# Patient Record
Sex: MALE | Race: WHITE
[De-identification: names, ages, dates, MRNs, and addresses within clinical notes are randomized per-mention and may not be internally consistent; named-entity substitution may affect disease eponyms.]

---

## 2018-06-05 ENCOUNTER — HOSPITAL ENCOUNTER (OUTPATIENT)
Dept: HOSPITAL 8 - CFH | Age: 68
End: 2018-06-05
Attending: INTERNAL MEDICINE
Payer: MEDICARE

## 2018-06-05 DIAGNOSIS — I48.0: Primary | ICD-10-CM

## 2018-06-05 DIAGNOSIS — I10: ICD-10-CM

## 2018-06-05 PROCEDURE — 78452 HT MUSCLE IMAGE SPECT MULT: CPT

## 2018-06-05 PROCEDURE — A9502 TC99M TETROFOSMIN: HCPCS

## 2018-06-05 PROCEDURE — 93017 CV STRESS TEST TRACING ONLY: CPT

## 2018-08-08 ENCOUNTER — APPOINTMENT (OUTPATIENT)
Dept: OTHER | Facility: IMAGING CENTER | Age: 68
End: 2018-08-08

## 2018-08-14 ENCOUNTER — HOSPITAL ENCOUNTER (OUTPATIENT)
Dept: RADIOLOGY | Facility: MEDICAL CENTER | Age: 68
DRG: 460 | End: 2018-08-14
Attending: NEUROLOGICAL SURGERY | Admitting: NEUROLOGICAL SURGERY
Payer: MEDICARE

## 2018-08-14 DIAGNOSIS — Z01.810 PRE-OPERATIVE CARDIOVASCULAR EXAMINATION: ICD-10-CM

## 2018-08-14 DIAGNOSIS — Z01.811 PRE-OPERATIVE RESPIRATORY EXAMINATION: ICD-10-CM

## 2018-08-14 DIAGNOSIS — Z01.812 PRE-OPERATIVE LABORATORY EXAMINATION: ICD-10-CM

## 2018-08-14 LAB
ANION GAP SERPL CALC-SCNC: 8 MMOL/L (ref 0–11.9)
APTT PPP: 25.3 SEC (ref 24.7–36)
BASOPHILS # BLD AUTO: 0.5 % (ref 0–1.8)
BASOPHILS # BLD: 0.04 K/UL (ref 0–0.12)
BUN SERPL-MCNC: 14 MG/DL (ref 8–22)
CALCIUM SERPL-MCNC: 9.3 MG/DL (ref 8.5–10.5)
CHLORIDE SERPL-SCNC: 102 MMOL/L (ref 96–112)
CO2 SERPL-SCNC: 28 MMOL/L (ref 20–33)
CREAT SERPL-MCNC: 0.88 MG/DL (ref 0.5–1.4)
EKG IMPRESSION: NORMAL
EOSINOPHIL # BLD AUTO: 0.15 K/UL (ref 0–0.51)
EOSINOPHIL NFR BLD: 1.7 % (ref 0–6.9)
ERYTHROCYTE [DISTWIDTH] IN BLOOD BY AUTOMATED COUNT: 44.6 FL (ref 35.9–50)
GLUCOSE SERPL-MCNC: 118 MG/DL (ref 65–99)
HCT VFR BLD AUTO: 47.4 % (ref 42–52)
HGB BLD-MCNC: 15.5 G/DL (ref 14–18)
IMM GRANULOCYTES # BLD AUTO: 0.03 K/UL (ref 0–0.11)
IMM GRANULOCYTES NFR BLD AUTO: 0.3 % (ref 0–0.9)
INR PPP: 1.02 (ref 0.87–1.13)
LYMPHOCYTES # BLD AUTO: 2.32 K/UL (ref 1–4.8)
LYMPHOCYTES NFR BLD: 26.9 % (ref 22–41)
MCH RBC QN AUTO: 29.4 PG (ref 27–33)
MCHC RBC AUTO-ENTMCNC: 32.7 G/DL (ref 33.7–35.3)
MCV RBC AUTO: 89.9 FL (ref 81.4–97.8)
MONOCYTES # BLD AUTO: 0.63 K/UL (ref 0–0.85)
MONOCYTES NFR BLD AUTO: 7.3 % (ref 0–13.4)
NEUTROPHILS # BLD AUTO: 5.45 K/UL (ref 1.82–7.42)
NEUTROPHILS NFR BLD: 63.3 % (ref 44–72)
NRBC # BLD AUTO: 0 K/UL
NRBC BLD-RTO: 0 /100 WBC
PLATELET # BLD AUTO: 237 K/UL (ref 164–446)
PMV BLD AUTO: 10.6 FL (ref 9–12.9)
POTASSIUM SERPL-SCNC: 3.6 MMOL/L (ref 3.6–5.5)
PROTHROMBIN TIME: 13.1 SEC (ref 12–14.6)
RBC # BLD AUTO: 5.27 M/UL (ref 4.7–6.1)
SODIUM SERPL-SCNC: 138 MMOL/L (ref 135–145)
WBC # BLD AUTO: 8.6 K/UL (ref 4.8–10.8)

## 2018-08-14 PROCEDURE — 71046 X-RAY EXAM CHEST 2 VIEWS: CPT

## 2018-08-14 PROCEDURE — 80048 BASIC METABOLIC PNL TOTAL CA: CPT

## 2018-08-14 PROCEDURE — 93010 ELECTROCARDIOGRAM REPORT: CPT | Performed by: INTERNAL MEDICINE

## 2018-08-14 PROCEDURE — 85730 THROMBOPLASTIN TIME PARTIAL: CPT

## 2018-08-14 PROCEDURE — 85025 COMPLETE CBC W/AUTO DIFF WBC: CPT

## 2018-08-14 PROCEDURE — 85610 PROTHROMBIN TIME: CPT

## 2018-08-14 PROCEDURE — 93005 ELECTROCARDIOGRAM TRACING: CPT

## 2018-08-14 PROCEDURE — 36415 COLL VENOUS BLD VENIPUNCTURE: CPT

## 2018-08-14 RX ORDER — CLOPIDOGREL BISULFATE 75 MG/1
75 TABLET ORAL DAILY
Status: ON HOLD | COMMUNITY
End: 2018-08-30

## 2018-08-14 RX ORDER — OXYCODONE HYDROCHLORIDE 10 MG/1
10 TABLET ORAL 3 TIMES DAILY
COMMUNITY

## 2018-08-14 RX ORDER — METOPROLOL SUCCINATE 50 MG/1
50 TABLET, EXTENDED RELEASE ORAL EVERY EVENING
COMMUNITY

## 2018-08-14 RX ORDER — IMIPRAMINE HCL 50 MG
100 TABLET ORAL NIGHTLY
COMMUNITY

## 2018-08-14 RX ORDER — GLIPIZIDE 10 MG/1
10 TABLET ORAL EVERY EVENING
COMMUNITY

## 2018-08-28 ENCOUNTER — APPOINTMENT (OUTPATIENT)
Dept: RADIOLOGY | Facility: MEDICAL CENTER | Age: 68
DRG: 460 | End: 2018-08-28
Attending: NEUROLOGICAL SURGERY
Payer: MEDICARE

## 2018-08-28 ENCOUNTER — HOSPITAL ENCOUNTER (INPATIENT)
Facility: MEDICAL CENTER | Age: 68
LOS: 2 days | DRG: 460 | End: 2018-08-30
Attending: NEUROLOGICAL SURGERY | Admitting: NEUROLOGICAL SURGERY
Payer: MEDICARE

## 2018-08-28 LAB
GLUCOSE BLD-MCNC: 125 MG/DL (ref 65–99)
GLUCOSE BLD-MCNC: 142 MG/DL (ref 65–99)
GLUCOSE BLD-MCNC: 149 MG/DL (ref 65–99)

## 2018-08-28 PROCEDURE — 160009 HCHG ANES TIME/MIN: Performed by: NEUROLOGICAL SURGERY

## 2018-08-28 PROCEDURE — 500885 HCHG PACK, JACKSON TABLE: Performed by: NEUROLOGICAL SURGERY

## 2018-08-28 PROCEDURE — 0SG3071 FUSION OF LUMBOSACRAL JOINT WITH AUTOLOGOUS TISSUE SUBSTITUTE, POSTERIOR APPROACH, POSTERIOR COLUMN, OPEN APPROACH: ICD-10-PCS | Performed by: NEUROLOGICAL SURGERY

## 2018-08-28 PROCEDURE — A9270 NON-COVERED ITEM OR SERVICE: HCPCS

## 2018-08-28 PROCEDURE — A4314 CATH W/DRAINAGE 2-WAY LATEX: HCPCS | Performed by: NEUROLOGICAL SURGERY

## 2018-08-28 PROCEDURE — 700111 HCHG RX REV CODE 636 W/ 250 OVERRIDE (IP)

## 2018-08-28 PROCEDURE — 700101 HCHG RX REV CODE 250

## 2018-08-28 PROCEDURE — 500367 HCHG DRAIN KIT, HEMOVAC: Performed by: NEUROLOGICAL SURGERY

## 2018-08-28 PROCEDURE — 01NB0ZZ RELEASE LUMBAR NERVE, OPEN APPROACH: ICD-10-PCS | Performed by: NEUROLOGICAL SURGERY

## 2018-08-28 PROCEDURE — A9270 NON-COVERED ITEM OR SERVICE: HCPCS | Performed by: NURSE PRACTITIONER

## 2018-08-28 PROCEDURE — 700102 HCHG RX REV CODE 250 W/ 637 OVERRIDE(OP)

## 2018-08-28 PROCEDURE — 700102 HCHG RX REV CODE 250 W/ 637 OVERRIDE(OP): Performed by: NURSE PRACTITIONER

## 2018-08-28 PROCEDURE — 110371 HCHG SHELL REV 272: Performed by: NEUROLOGICAL SURGERY

## 2018-08-28 PROCEDURE — 160035 HCHG PACU - 1ST 60 MINS PHASE I: Performed by: NEUROLOGICAL SURGERY

## 2018-08-28 PROCEDURE — 160029 HCHG SURGERY MINUTES - 1ST 30 MINS LEVEL 4: Performed by: NEUROLOGICAL SURGERY

## 2018-08-28 PROCEDURE — C1713 ANCHOR/SCREW BN/BN,TIS/BN: HCPCS | Performed by: NEUROLOGICAL SURGERY

## 2018-08-28 PROCEDURE — 160002 HCHG RECOVERY MINUTES (STAT): Performed by: NEUROLOGICAL SURGERY

## 2018-08-28 PROCEDURE — 700111 HCHG RX REV CODE 636 W/ 250 OVERRIDE (IP): Performed by: NURSE PRACTITIONER

## 2018-08-28 PROCEDURE — 160048 HCHG OR STATISTICAL LEVEL 1-5: Performed by: NEUROLOGICAL SURGERY

## 2018-08-28 PROCEDURE — 501838 HCHG SUTURE GENERAL: Performed by: NEUROLOGICAL SURGERY

## 2018-08-28 PROCEDURE — 82962 GLUCOSE BLOOD TEST: CPT

## 2018-08-28 PROCEDURE — 700112 HCHG RX REV CODE 229: Performed by: NURSE PRACTITIONER

## 2018-08-28 PROCEDURE — 501837 HCHG SUTURE CV: Performed by: NEUROLOGICAL SURGERY

## 2018-08-28 PROCEDURE — 160041 HCHG SURGERY MINUTES - EA ADDL 1 MIN LEVEL 4: Performed by: NEUROLOGICAL SURGERY

## 2018-08-28 PROCEDURE — 0SB40ZZ EXCISION OF LUMBOSACRAL DISC, OPEN APPROACH: ICD-10-PCS | Performed by: NEUROLOGICAL SURGERY

## 2018-08-28 PROCEDURE — 700101 HCHG RX REV CODE 250: Performed by: NURSE PRACTITIONER

## 2018-08-28 PROCEDURE — 01NR0ZZ RELEASE SACRAL NERVE, OPEN APPROACH: ICD-10-PCS | Performed by: NEUROLOGICAL SURGERY

## 2018-08-28 PROCEDURE — 160036 HCHG PACU - EA ADDL 30 MINS PHASE I: Performed by: NEUROLOGICAL SURGERY

## 2018-08-28 PROCEDURE — 110454 HCHG SHELL REV 250: Performed by: NEUROLOGICAL SURGERY

## 2018-08-28 PROCEDURE — 770001 HCHG ROOM/CARE - MED/SURG/GYN PRIV*

## 2018-08-28 PROCEDURE — 00QT0ZZ REPAIR SPINAL MENINGES, OPEN APPROACH: ICD-10-PCS | Performed by: NEUROLOGICAL SURGERY

## 2018-08-28 PROCEDURE — 72100 X-RAY EXAM L-S SPINE 2/3 VWS: CPT

## 2018-08-28 PROCEDURE — 502240 HCHG MISC OR SUPPLY RC 0272: Performed by: NEUROLOGICAL SURGERY

## 2018-08-28 DEVICE — SCREW MAS  SOLERA 6.5 X 50MM (1TCX16+3TCX8=40): Type: IMPLANTABLE DEVICE | Site: SPINE LUMBAR | Status: FUNCTIONAL

## 2018-08-28 DEVICE — SCREW SOLERA SET SCREW (1TCX40+3TCX21+2TCX10=123): Type: IMPLANTABLE DEVICE | Site: SPINE LUMBAR | Status: FUNCTIONAL

## 2018-08-28 DEVICE — ROD PREBENT TITANIUM 5.5 X 35MM (2TCONX2=4): Type: IMPLANTABLE DEVICE | Site: SPINE LUMBAR | Status: FUNCTIONAL

## 2018-08-28 DEVICE — ROD PREBENT TITANIUM 5.5 X 40MM (2TCONX2=4): Type: IMPLANTABLE DEVICE | Site: SPINE LUMBAR | Status: FUNCTIONAL

## 2018-08-28 DEVICE — DURASEAL SEALANT SYSTEM 5ML - (5/BX): Type: IMPLANTABLE DEVICE | Site: SPINE LUMBAR | Status: FUNCTIONAL

## 2018-08-28 DEVICE — CROSSLINK TSRH 5.5 MULTI 1.75 - (3TX2=6) 1.75-2.15: Type: IMPLANTABLE DEVICE | Site: SPINE LUMBAR | Status: FUNCTIONAL

## 2018-08-28 DEVICE — GRAFT DURAMATRIX ONLAY PLUS 1IN X 1IN OR 2.7CM X 2.75CM: Type: IMPLANTABLE DEVICE | Site: SPINE LUMBAR | Status: FUNCTIONAL

## 2018-08-28 RX ORDER — SIMVASTATIN 20 MG
40 TABLET ORAL NIGHTLY
Status: DISCONTINUED | OUTPATIENT
Start: 2018-08-28 | End: 2018-08-30 | Stop reason: HOSPADM

## 2018-08-28 RX ORDER — METOPROLOL SUCCINATE 25 MG/1
50 TABLET, EXTENDED RELEASE ORAL EVERY EVENING
Status: DISCONTINUED | OUTPATIENT
Start: 2018-08-28 | End: 2018-08-30 | Stop reason: HOSPADM

## 2018-08-28 RX ORDER — LIDOCAINE HYDROCHLORIDE 10 MG/ML
0.5 INJECTION, SOLUTION INFILTRATION; PERINEURAL
Status: DISCONTINUED | OUTPATIENT
Start: 2018-08-28 | End: 2018-08-28

## 2018-08-28 RX ORDER — CLONIDINE HYDROCHLORIDE 0.1 MG/1
0.2 TABLET ORAL 2 TIMES DAILY
Status: DISCONTINUED | OUTPATIENT
Start: 2018-08-28 | End: 2018-08-30 | Stop reason: HOSPADM

## 2018-08-28 RX ORDER — SODIUM CHLORIDE 9 MG/ML
INJECTION, SOLUTION INTRAVENOUS CONTINUOUS
Status: DISCONTINUED | OUTPATIENT
Start: 2018-08-28 | End: 2018-08-28

## 2018-08-28 RX ORDER — LIDOCAINE HYDROCHLORIDE 10 MG/ML
INJECTION, SOLUTION INFILTRATION; PERINEURAL
Status: COMPLETED
Start: 2018-08-28 | End: 2018-08-28

## 2018-08-28 RX ORDER — ONDANSETRON 2 MG/ML
4 INJECTION INTRAMUSCULAR; INTRAVENOUS EVERY 4 HOURS PRN
Status: DISCONTINUED | OUTPATIENT
Start: 2018-08-28 | End: 2018-08-30 | Stop reason: HOSPADM

## 2018-08-28 RX ORDER — DIPHENHYDRAMINE HCL 25 MG
25 TABLET ORAL EVERY 6 HOURS PRN
Status: DISCONTINUED | OUTPATIENT
Start: 2018-08-28 | End: 2018-08-30 | Stop reason: HOSPADM

## 2018-08-28 RX ORDER — ENEMA 19; 7 G/133ML; G/133ML
1 ENEMA RECTAL
Status: DISCONTINUED | OUTPATIENT
Start: 2018-08-28 | End: 2018-08-30 | Stop reason: HOSPADM

## 2018-08-28 RX ORDER — SODIUM CHLORIDE AND POTASSIUM CHLORIDE 150; 900 MG/100ML; MG/100ML
INJECTION, SOLUTION INTRAVENOUS CONTINUOUS
Status: DISCONTINUED | OUTPATIENT
Start: 2018-08-28 | End: 2018-08-29

## 2018-08-28 RX ORDER — DEXTROSE MONOHYDRATE 25 G/50ML
25 INJECTION, SOLUTION INTRAVENOUS
Status: DISCONTINUED | OUTPATIENT
Start: 2018-08-28 | End: 2018-08-30 | Stop reason: HOSPADM

## 2018-08-28 RX ORDER — BISACODYL 10 MG
10 SUPPOSITORY, RECTAL RECTAL
Status: DISCONTINUED | OUTPATIENT
Start: 2018-08-28 | End: 2018-08-30 | Stop reason: HOSPADM

## 2018-08-28 RX ORDER — DOCUSATE SODIUM 100 MG/1
100 CAPSULE, LIQUID FILLED ORAL 2 TIMES DAILY
Status: DISCONTINUED | OUTPATIENT
Start: 2018-08-28 | End: 2018-08-30 | Stop reason: HOSPADM

## 2018-08-28 RX ORDER — LABETALOL HYDROCHLORIDE 5 MG/ML
10 INJECTION, SOLUTION INTRAVENOUS
Status: DISCONTINUED | OUTPATIENT
Start: 2018-08-28 | End: 2018-08-30 | Stop reason: HOSPADM

## 2018-08-28 RX ORDER — CYCLOBENZAPRINE HCL 10 MG
10 TABLET ORAL EVERY 8 HOURS PRN
Status: DISCONTINUED | OUTPATIENT
Start: 2018-08-28 | End: 2018-08-30 | Stop reason: HOSPADM

## 2018-08-28 RX ORDER — IMIPRAMINE HCL 50 MG
100 TABLET ORAL NIGHTLY
Status: DISCONTINUED | OUTPATIENT
Start: 2018-08-28 | End: 2018-08-30 | Stop reason: HOSPADM

## 2018-08-28 RX ORDER — BACITRACIN 50000 [IU]/1
INJECTION, POWDER, FOR SOLUTION INTRAMUSCULAR
Status: DISCONTINUED | OUTPATIENT
Start: 2018-08-28 | End: 2018-08-28 | Stop reason: HOSPADM

## 2018-08-28 RX ORDER — BUPIVACAINE HYDROCHLORIDE AND EPINEPHRINE 5; 5 MG/ML; UG/ML
INJECTION, SOLUTION PERINEURAL
Status: DISCONTINUED | OUTPATIENT
Start: 2018-08-28 | End: 2018-08-28 | Stop reason: HOSPADM

## 2018-08-28 RX ORDER — POLYETHYLENE GLYCOL 3350 17 G/17G
1 POWDER, FOR SOLUTION ORAL 2 TIMES DAILY PRN
Status: DISCONTINUED | OUTPATIENT
Start: 2018-08-28 | End: 2018-08-30 | Stop reason: HOSPADM

## 2018-08-28 RX ORDER — GABAPENTIN 300 MG/1
300 CAPSULE ORAL 3 TIMES DAILY
Status: DISCONTINUED | OUTPATIENT
Start: 2018-08-28 | End: 2018-08-30 | Stop reason: HOSPADM

## 2018-08-28 RX ORDER — HYDRALAZINE HYDROCHLORIDE 20 MG/ML
10 INJECTION INTRAMUSCULAR; INTRAVENOUS
Status: DISCONTINUED | OUTPATIENT
Start: 2018-08-28 | End: 2018-08-30 | Stop reason: HOSPADM

## 2018-08-28 RX ORDER — LOSARTAN POTASSIUM 50 MG/1
50 TABLET ORAL 2 TIMES DAILY
Status: DISCONTINUED | OUTPATIENT
Start: 2018-08-28 | End: 2018-08-30 | Stop reason: HOSPADM

## 2018-08-28 RX ORDER — AMOXICILLIN 250 MG
1 CAPSULE ORAL NIGHTLY
Status: DISCONTINUED | OUTPATIENT
Start: 2018-08-28 | End: 2018-08-30 | Stop reason: HOSPADM

## 2018-08-28 RX ORDER — GABAPENTIN 300 MG/1
CAPSULE ORAL
Status: DISPENSED
Start: 2018-08-28 | End: 2018-08-29

## 2018-08-28 RX ORDER — ONDANSETRON 2 MG/ML
INJECTION INTRAMUSCULAR; INTRAVENOUS
Status: COMPLETED
Start: 2018-08-28 | End: 2018-08-28

## 2018-08-28 RX ORDER — FUROSEMIDE 40 MG/1
40 TABLET ORAL 2 TIMES DAILY
Status: DISCONTINUED | OUTPATIENT
Start: 2018-08-28 | End: 2018-08-30 | Stop reason: HOSPADM

## 2018-08-28 RX ORDER — BRIMONIDINE TARTRATE AND TIMOLOL MALEATE 2; 5 MG/ML; MG/ML
1 SOLUTION OPHTHALMIC 2 TIMES DAILY
Status: DISCONTINUED | OUTPATIENT
Start: 2018-08-28 | End: 2018-08-28

## 2018-08-28 RX ORDER — TIMOLOL MALEATE 5 MG/ML
1 SOLUTION/ DROPS OPHTHALMIC 2 TIMES DAILY
Status: DISCONTINUED | OUTPATIENT
Start: 2018-08-28 | End: 2018-08-30 | Stop reason: HOSPADM

## 2018-08-28 RX ORDER — GLIPIZIDE 5 MG/1
10 TABLET ORAL
Status: DISCONTINUED | OUTPATIENT
Start: 2018-08-28 | End: 2018-08-30 | Stop reason: HOSPADM

## 2018-08-28 RX ORDER — GABAPENTIN 300 MG/1
300 CAPSULE ORAL 3 TIMES DAILY
COMMUNITY

## 2018-08-28 RX ORDER — BRIMONIDINE TARTRATE 2 MG/ML
1 SOLUTION/ DROPS OPHTHALMIC 2 TIMES DAILY
Status: DISCONTINUED | OUTPATIENT
Start: 2018-08-28 | End: 2018-08-30 | Stop reason: HOSPADM

## 2018-08-28 RX ORDER — AMOXICILLIN 250 MG
1 CAPSULE ORAL
Status: DISCONTINUED | OUTPATIENT
Start: 2018-08-28 | End: 2018-08-30 | Stop reason: HOSPADM

## 2018-08-28 RX ORDER — CEFAZOLIN SODIUM 2 G/100ML
2 INJECTION, SOLUTION INTRAVENOUS EVERY 8 HOURS
Status: DISCONTINUED | OUTPATIENT
Start: 2018-08-28 | End: 2018-08-30 | Stop reason: HOSPADM

## 2018-08-28 RX ORDER — ACETAMINOPHEN 500 MG
TABLET ORAL
Status: DISPENSED
Start: 2018-08-28 | End: 2018-08-29

## 2018-08-28 RX ORDER — ONDANSETRON 4 MG/1
4 TABLET, ORALLY DISINTEGRATING ORAL EVERY 4 HOURS PRN
Status: DISCONTINUED | OUTPATIENT
Start: 2018-08-28 | End: 2018-08-30 | Stop reason: HOSPADM

## 2018-08-28 RX ORDER — DIPHENHYDRAMINE HYDROCHLORIDE 50 MG/ML
25 INJECTION INTRAMUSCULAR; INTRAVENOUS EVERY 6 HOURS PRN
Status: DISCONTINUED | OUTPATIENT
Start: 2018-08-28 | End: 2018-08-30 | Stop reason: HOSPADM

## 2018-08-28 RX ORDER — DOCUSATE SODIUM 100 MG/1
200 CAPSULE, LIQUID FILLED ORAL 3 TIMES DAILY PRN
Status: DISCONTINUED | OUTPATIENT
Start: 2018-08-28 | End: 2018-08-28

## 2018-08-28 RX ADMIN — FUROSEMIDE 40 MG: 40 TABLET ORAL at 21:32

## 2018-08-28 RX ADMIN — CEFAZOLIN SODIUM 2 G: 2 INJECTION, SOLUTION INTRAVENOUS at 20:38

## 2018-08-28 RX ADMIN — CLONIDINE HYDROCHLORIDE 0.2 MG: 0.1 TABLET ORAL at 21:33

## 2018-08-28 RX ADMIN — MORPHINE SULFATE: 50 INJECTION, SOLUTION, CONCENTRATE INTRAVENOUS at 18:00

## 2018-08-28 RX ADMIN — METOPROLOL SUCCINATE 50 MG: 25 TABLET, EXTENDED RELEASE ORAL at 21:34

## 2018-08-28 RX ADMIN — GABAPENTIN 300 MG: 300 CAPSULE ORAL at 21:33

## 2018-08-28 RX ADMIN — TIMOLOL MALEATE 1 DROP: 5 SOLUTION OPHTHALMIC at 22:50

## 2018-08-28 RX ADMIN — LIDOCAINE HYDROCHLORIDE 0.5 ML: 10 INJECTION, SOLUTION INFILTRATION; PERINEURAL at 10:55

## 2018-08-28 RX ADMIN — DOCUSATE SODIUM 100 MG: 100 CAPSULE, LIQUID FILLED ORAL at 21:34

## 2018-08-28 RX ADMIN — BRIMONIDINE TARTRATE 1 DROP: 2 SOLUTION/ DROPS OPHTHALMIC at 22:50

## 2018-08-28 RX ADMIN — ONDANSETRON HYDROCHLORIDE 4 MG: 2 INJECTION, SOLUTION INTRAMUSCULAR; INTRAVENOUS at 18:27

## 2018-08-28 RX ADMIN — SIMVASTATIN 40 MG: 20 TABLET, FILM COATED ORAL at 21:33

## 2018-08-28 RX ADMIN — Medication 1 TABLET: at 21:34

## 2018-08-28 RX ADMIN — SODIUM CHLORIDE: 9 INJECTION, SOLUTION INTRAVENOUS at 10:55

## 2018-08-28 RX ADMIN — IMIPRAMINE HYDROCHLORIDE 100 MG: 50 TABLET, FILM COATED ORAL at 22:49

## 2018-08-28 RX ADMIN — LOSARTAN POTASSIUM 50 MG: 50 TABLET ORAL at 21:32

## 2018-08-28 RX ADMIN — GLIPIZIDE 10 MG: 10 TABLET ORAL at 21:32

## 2018-08-28 ASSESSMENT — PAIN SCALES - GENERAL
PAINLEVEL_OUTOF10: 7
PAINLEVEL_OUTOF10: 8
PAINLEVEL_OUTOF10: 8
PAINLEVEL_OUTOF10: 7
PAINLEVEL_OUTOF10: 7
PAINLEVEL_OUTOF10: 8
PAINLEVEL_OUTOF10: 8
PAINLEVEL_OUTOF10: 7

## 2018-08-28 ASSESSMENT — PATIENT HEALTH QUESTIONNAIRE - PHQ9
2. FEELING DOWN, DEPRESSED, IRRITABLE, OR HOPELESS: NOT AT ALL
1. LITTLE INTEREST OR PLEASURE IN DOING THINGS: NOT AT ALL
SUM OF ALL RESPONSES TO PHQ9 QUESTIONS 1 AND 2: 0

## 2018-08-28 ASSESSMENT — LIFESTYLE VARIABLES
EVER_SMOKED: YES
ALCOHOL_USE: NO

## 2018-08-28 NOTE — PROGRESS NOTES
Med rec complete per pt.s family at bedside  Interviewed pt with family at bedside with permission from pt  Allergies reviewed and updated.  Family states Pain Pump is same and will bring in flow sheet and eye drops later.

## 2018-08-29 LAB
ANION GAP SERPL CALC-SCNC: 7 MMOL/L (ref 0–11.9)
BUN SERPL-MCNC: 12 MG/DL (ref 8–22)
CALCIUM SERPL-MCNC: 8.2 MG/DL (ref 8.5–10.5)
CHLORIDE SERPL-SCNC: 105 MMOL/L (ref 96–112)
CO2 SERPL-SCNC: 27 MMOL/L (ref 20–33)
CREAT SERPL-MCNC: 0.68 MG/DL (ref 0.5–1.4)
ERYTHROCYTE [DISTWIDTH] IN BLOOD BY AUTOMATED COUNT: 46 FL (ref 35.9–50)
GLUCOSE BLD-MCNC: 104 MG/DL (ref 65–99)
GLUCOSE BLD-MCNC: 120 MG/DL (ref 65–99)
GLUCOSE BLD-MCNC: 125 MG/DL (ref 65–99)
GLUCOSE BLD-MCNC: 133 MG/DL (ref 65–99)
GLUCOSE SERPL-MCNC: 141 MG/DL (ref 65–99)
HCT VFR BLD AUTO: 44.2 % (ref 42–52)
HGB BLD-MCNC: 14 G/DL (ref 14–18)
MCH RBC QN AUTO: 28.8 PG (ref 27–33)
MCHC RBC AUTO-ENTMCNC: 31.7 G/DL (ref 33.7–35.3)
MCV RBC AUTO: 90.9 FL (ref 81.4–97.8)
PLATELET # BLD AUTO: 197 K/UL (ref 164–446)
PMV BLD AUTO: 10.5 FL (ref 9–12.9)
POTASSIUM SERPL-SCNC: 4 MMOL/L (ref 3.6–5.5)
RBC # BLD AUTO: 4.86 M/UL (ref 4.7–6.1)
SODIUM SERPL-SCNC: 139 MMOL/L (ref 135–145)
WBC # BLD AUTO: 12.6 K/UL (ref 4.8–10.8)

## 2018-08-29 PROCEDURE — A9270 NON-COVERED ITEM OR SERVICE: HCPCS | Performed by: NURSE PRACTITIONER

## 2018-08-29 PROCEDURE — G8978 MOBILITY CURRENT STATUS: HCPCS | Mod: CI

## 2018-08-29 PROCEDURE — 85027 COMPLETE CBC AUTOMATED: CPT

## 2018-08-29 PROCEDURE — 700102 HCHG RX REV CODE 250 W/ 637 OVERRIDE(OP): Performed by: NURSE PRACTITIONER

## 2018-08-29 PROCEDURE — 80048 BASIC METABOLIC PNL TOTAL CA: CPT

## 2018-08-29 PROCEDURE — 82962 GLUCOSE BLOOD TEST: CPT | Mod: 91

## 2018-08-29 PROCEDURE — 770001 HCHG ROOM/CARE - MED/SURG/GYN PRIV*

## 2018-08-29 PROCEDURE — 700111 HCHG RX REV CODE 636 W/ 250 OVERRIDE (IP): Performed by: NURSE PRACTITIONER

## 2018-08-29 PROCEDURE — 700112 HCHG RX REV CODE 229: Performed by: NURSE PRACTITIONER

## 2018-08-29 PROCEDURE — 36415 COLL VENOUS BLD VENIPUNCTURE: CPT

## 2018-08-29 PROCEDURE — G8979 MOBILITY GOAL STATUS: HCPCS | Mod: CI

## 2018-08-29 PROCEDURE — 97161 PT EVAL LOW COMPLEX 20 MIN: CPT

## 2018-08-29 PROCEDURE — 700101 HCHG RX REV CODE 250: Performed by: NURSE PRACTITIONER

## 2018-08-29 RX ORDER — OXYCODONE HYDROCHLORIDE 10 MG/1
10 TABLET ORAL EVERY 4 HOURS PRN
Status: DISCONTINUED | OUTPATIENT
Start: 2018-08-29 | End: 2018-08-30 | Stop reason: HOSPADM

## 2018-08-29 RX ADMIN — GLIPIZIDE 10 MG: 10 TABLET ORAL at 17:23

## 2018-08-29 RX ADMIN — SIMVASTATIN 40 MG: 20 TABLET, FILM COATED ORAL at 21:37

## 2018-08-29 RX ADMIN — OXYCODONE HYDROCHLORIDE 10 MG: 10 TABLET ORAL at 21:37

## 2018-08-29 RX ADMIN — GABAPENTIN 300 MG: 300 CAPSULE ORAL at 17:23

## 2018-08-29 RX ADMIN — CEFAZOLIN SODIUM 2 G: 2 INJECTION, SOLUTION INTRAVENOUS at 05:54

## 2018-08-29 RX ADMIN — TIMOLOL MALEATE 1 DROP: 5 SOLUTION OPHTHALMIC at 05:56

## 2018-08-29 RX ADMIN — GABAPENTIN 300 MG: 300 CAPSULE ORAL at 13:25

## 2018-08-29 RX ADMIN — FUROSEMIDE 40 MG: 40 TABLET ORAL at 05:54

## 2018-08-29 RX ADMIN — GABAPENTIN 300 MG: 300 CAPSULE ORAL at 05:54

## 2018-08-29 RX ADMIN — FUROSEMIDE 40 MG: 40 TABLET ORAL at 17:24

## 2018-08-29 RX ADMIN — DOCUSATE SODIUM 100 MG: 100 CAPSULE, LIQUID FILLED ORAL at 05:54

## 2018-08-29 RX ADMIN — BRIMONIDINE TARTRATE 1 DROP: 2 SOLUTION/ DROPS OPHTHALMIC at 05:56

## 2018-08-29 RX ADMIN — BRIMONIDINE TARTRATE 1 DROP: 2 SOLUTION/ DROPS OPHTHALMIC at 17:12

## 2018-08-29 RX ADMIN — LOSARTAN POTASSIUM 50 MG: 50 TABLET ORAL at 17:24

## 2018-08-29 RX ADMIN — CEFAZOLIN SODIUM 2 G: 2 INJECTION, SOLUTION INTRAVENOUS at 21:36

## 2018-08-29 RX ADMIN — METOPROLOL SUCCINATE 50 MG: 25 TABLET, EXTENDED RELEASE ORAL at 17:23

## 2018-08-29 RX ADMIN — Medication 1 TABLET: at 21:37

## 2018-08-29 RX ADMIN — POTASSIUM CHLORIDE AND SODIUM CHLORIDE: 900; 150 INJECTION, SOLUTION INTRAVENOUS at 05:00

## 2018-08-29 RX ADMIN — DOCUSATE SODIUM 100 MG: 100 CAPSULE, LIQUID FILLED ORAL at 17:23

## 2018-08-29 RX ADMIN — IMIPRAMINE HYDROCHLORIDE 100 MG: 50 TABLET, FILM COATED ORAL at 21:36

## 2018-08-29 RX ADMIN — CLONIDINE HYDROCHLORIDE 0.2 MG: 0.1 TABLET ORAL at 17:24

## 2018-08-29 RX ADMIN — OXYCODONE HYDROCHLORIDE 10 MG: 10 TABLET ORAL at 17:11

## 2018-08-29 RX ADMIN — OXYCODONE HYDROCHLORIDE 10 MG: 10 TABLET ORAL at 09:42

## 2018-08-29 RX ADMIN — TIMOLOL MALEATE 1 DROP: 5 SOLUTION OPHTHALMIC at 17:13

## 2018-08-29 RX ADMIN — CEFAZOLIN SODIUM 2 G: 2 INJECTION, SOLUTION INTRAVENOUS at 13:26

## 2018-08-29 ASSESSMENT — GAIT ASSESSMENTS
DEVIATION: BRADYKINETIC
DISTANCE (FEET): 150
ASSISTIVE DEVICE: 4 WHEEL WALKER
GAIT LEVEL OF ASSIST: STAND BY ASSIST

## 2018-08-29 ASSESSMENT — COGNITIVE AND FUNCTIONAL STATUS - GENERAL
SUGGESTED CMS G CODE MODIFIER MOBILITY: CL
CLIMB 3 TO 5 STEPS WITH RAILING: A LITTLE
MOVING TO AND FROM BED TO CHAIR: UNABLE
MOVING FROM LYING ON BACK TO SITTING ON SIDE OF FLAT BED: A LITTLE
STANDING UP FROM CHAIR USING ARMS: A LITTLE
MOBILITY SCORE: 14
TURNING FROM BACK TO SIDE WHILE IN FLAT BAD: UNABLE
WALKING IN HOSPITAL ROOM: A LITTLE

## 2018-08-29 ASSESSMENT — PAIN SCALES - GENERAL
PAINLEVEL_OUTOF10: 8
PAINLEVEL_OUTOF10: 7
PAINLEVEL_OUTOF10: 5
PAINLEVEL_OUTOF10: 5
PAINLEVEL_OUTOF10: 8
PAINLEVEL_OUTOF10: 5
PAINLEVEL_OUTOF10: 9

## 2018-08-29 NOTE — PROGRESS NOTES
2 RN skin check complete. Skin intact, no signs of breakdown. Patient turns self from side to side.

## 2018-08-29 NOTE — PROGRESS NOTES
Neurosurgery Progress Note    Subjective:  States LE improved, eating, ambulating. Alvarez, pca    Exam:  BLE str intact- Hvac 200cc    BP  Min: 97/51  Max: 133/66  Pulse  Av.4  Min: 61  Max: 75  Resp  Avg: 15  Min: 9  Max: 20  Temp  Av.2 °C (97.2 °F)  Min: 35.8 °C (96.5 °F)  Max: 36.6 °C (97.8 °F)  SpO2  Av.1 %  Min: 85 %  Max: 100 %    No Data Recorded    Recent Labs      18   0444   WBC  12.6*   RBC  4.86   HEMOGLOBIN  14.0   HEMATOCRIT  44.2   MCV  90.9   MCH  28.8   MCHC  31.7*   RDW  46.0   PLATELETCT  197   MPV  10.5     Recent Labs      18   0444   SODIUM  139   POTASSIUM  4.0   CHLORIDE  105   CO2  27   GLUCOSE  141*   BUN  12   CREATININE  0.68   CALCIUM  8.2*               Intake/Output       18 0700 - 18 0659 18 07 - 18 0659      2106-3612 2462-6807 Total 6215-1962 8044-7117 Total       Intake    I.V.  1300  -- 1300  42  -- 42    Crystalloid Intake 1300 -- 1300 -- -- --    PCA End of Shift Total Volume (ml) -- -- -- 42 -- 42    Total Intake 1300 -- 1300 42 -- 42       Output    Urine  100  400 500  --  -- --    Urine Void (mL) (non-catheter) 100 -- 100 -- -- --    Output (mL) (Urinary Catheter Indwelling Catheter 16f) -- 400 400 -- -- --    Drains  120  360 480  --  -- --    Output (ml) (Surgical Drain Group Back Hemovac 1 (A)) 120 360 480 -- -- --    Blood  500  -- 500  --  -- --    Est. Blood Loss (mL) 500 -- 500 -- -- --    Total Output  -- -- --       Net I/O     580 -760 -180 42 -- 42            Intake/Output Summary (Last 24 hours) at 18 0847  Last data filed at 18 0713   Gross per 24 hour   Intake             1342 ml   Output             1480 ml   Net             -138 ml            • Pharmacy Consult Request  1 Each PRN   • MD ALERT...Do not administer NSAIDS or ASPIRIN unless ORDERED By Neurosurgery  1 Each PRN   • docusate sodium  100 mg BID   • senna-docusate  1 Tab Nightly   • senna-docusate  1 Tab Q24HRS PRN   •  polyethylene glycol/lytes  1 Packet BID PRN   • magnesium hydroxide  30 mL QDAY PRN   • bisacodyl  10 mg Q24HRS PRN   • fleet  1 Each Once PRN   • 0.9 % NaCl with KCl 20 mEq 1,000 mL   Continuous   • morphine   Continuous   • diphenhydrAMINE  25 mg Q6HRS PRN    Or   • diphenhydrAMINE  25 mg Q6HRS PRN   • ondansetron  4 mg Q4HRS PRN   • ondansetron  4 mg Q4HRS PRN   • cyclobenzaprine  10 mg Q8HRS PRN   • labetalol  10 mg Q HOUR PRN   • hydrALAZINE  10 mg Q HOUR PRN   • cloNIDine  0.2 mg BID   • furosemide  40 mg BID   • gabapentin  300 mg TID   • glipiZIDE  10 mg AC PM MEAL   • imipramine  100 mg Nightly   • losartan  50 mg BID   • metoprolol SR  50 mg Q EVENING   • simvastatin  40 mg Nightly   • insulin regular  1-6 Units 4X/DAY ACHS    And   • glucose 4 g  16 g Q15 MIN PRN    And   • dextrose 50%  25 mL Q15 MIN PRN   • ceFAZolin  2 g Q8HRS   • brimonidine  1 Drop BID   • timolol  1 Drop BID   • Pain Pump   Continuous   • pneumococcal 13-Chanel Conj Vacc  0.5 mL Once       Assessment and Plan:  POD #1 L5-S1 fusion  Prophylactic anticoagulation: no         Start date/time: tbd    Plan:  Abdi chavez DC pCA  Ambulate  Monitor hvac  Home reza?

## 2018-08-29 NOTE — PROGRESS NOTES
Patient resting quietly in bed, no S/S of distress, A&Ox4. Morphine PCA in use. Denies SOB, chest pain, dizziness. Bed alarm on, call light within reach,  pt calls appropriately and does not get out of bed. Bed in lowest position, bed locked, RN and CNA numbers provided, no further needs at this time. No changes from EPIC. Hourly rounding in place.

## 2018-08-29 NOTE — THERAPY
"Physical Therapy Evaluation completed.   Bed Mobility: Stand By Assist    Transfers: Stand By Assist    Gait: Stand By Assist with 4-Wheel Walker       Plan of Care: One time more for safety, if needed.   Discharge Recommendations: Equipment: No Equipment Needed. Post-acute therapy Discharge to home with outpatient for additional skilled therapy services.    Pt s/p L5-S1 surgical intervention and presents with report of incisional pain but completed bed mob without assist via log roll. During sit to stand and gait with 4WW, pt without overt LOB and limited distance due to fatigue. Pt can be seen for an additional visit for safety if needed. Otherwise, he will likely be functionally capable of return home upon DC.     See \"Rehab Therapy-Acute\" Patient Summary Report for complete documentation.     "

## 2018-08-29 NOTE — DIETARY
NUTRITION SERVICES: BMI - Pt with BMI >40 (=44.09). Weight loss counseling not appropriate in acute care setting. RECOMMEND - Referral to outpatient nutrition services for weight management after D/C.

## 2018-08-29 NOTE — CARE PLAN
Problem: Knowledge Deficit  Goal: Knowledge of disease process/condition, treatment plan, diagnostic tests, and medications will improve  POC: IV Ancef, monitor hemovac, PT     Problem: Discharge Barriers/Planning  Goal: Patient's continuum of care needs will be met  DC home tomorrow

## 2018-08-29 NOTE — OR NURSING
Pt more awake, alert and oriented x4. VSS. Dressing to back, CDI. CMS+. Pt reports 7/10 pain scale. Pt has hx of chronic pain. Pt has a personal pain pump. PCA-Morphine set-up and given to Pt. Pt denies numbness or tingling. No nausea or vomiting. Intact hemovac to back, drained 120cc sanguinous drainage. Intact chavez catheter, drained 200cc yellow urine. SCDs/ Teds in place. Report given to BRANDIE Altman. Pt's daughter updated. Awaiting transport.

## 2018-08-29 NOTE — OP REPORT
DATE OF SERVICE:  08/28/2018    PREOPERATIVE DIAGNOSIS:  L5-S1 spondylosis with radiculopathy.    POSTOPERATIVE DIAGNOSES:  1.  L5-S1 spondylosis with radiculopathy.  2.  Bilateral lateral calcified L5 disk.    OPERATION PERFORMED:  1.  Bilateral L5 laminectomy, total facetectomies, excision of bilateral   lateral calcified disk, decompression of bilateral L5 nerve roots.  2.  Bilateral S1 superior laminectomy, medial facetectomy, decompression of   bilateral S1 nerve roots.  3.  Posterolateral fusion, L5-S1.  4.  L5-S1 nonsegmental pedicle screw instrumentation (eYeka Solera   system).  5.  Harvesting and preparation of local bone graft.  6.  Suture repair, small dural defect, midline, superior S1.  7.  O Arm  Stereotaxy  SURGEON:  Isaac Miller MD    ASSISTANT:  JERONIMO Henao    ANESTHESIA:  General endotracheal.    ANESTHESIOLOGIST:  Markel Carrion MD    PREPARATION:  ChloraPrep.    MEDICATIONS:  The patient was given Ancef prior to incision.    INDICATIONS:  The patient is fused from the lower thoracic to L5.  He has got   spondylosis at L5-S1 compressing his L5 and S1 roots.  The patient was felt to   be a candidate for the proposed procedure to attempt to relieve radicular   pain.  The patient understood major risks and complications such as death,   paralysis exceedingly rare, main concern is a 15-20% failure rate to respond   to surgery for which further surgery may or may not be indicated, small risk   of wound infection, spinal fluid leak, fusion failure, hardware failure,   chance he will require another operation rest of his life 15-20%.  The patient   is understanding and agreed to proceed, and signed consent.    DESCRIPTION OF PROCEDURE:  The patient was brought to the operating room.    Peripheral venous lines were placed, general anesthesia was induced, the   patient was intubated.  Alvarez catheter was placed.  The patient laid prone on   the OSI table using the 6 posts.  Pressure points  carefully padded.  The back   was shaved, sterilely prepped and draped.  Previous incisions were opened in   the midline.  The patient had heterotopic bone in the soft tissues, which was   resected.  We dissected out the lamina of L5 and S1, just working out   laterally to expose the transverse process of L5 and the sacral ala.  Deep   Gelpi retractors were placed.  Using Midas Kingsley drill, the lamina of L5,   removed the inferior facets of L5, doing extensive foraminotomies around the   L5 roots, had calcified disk, which were excised underneath the L5 roots using   the drill, down biting curette and the small pituitary rongeurs.  The   superior aspect of S1 and the medial facets were removed to decompress the S1   nerve roots.  The patient had a very small dural tear in the midline at the   superior aspect of lamina of S1, which was sutured with interrupted 6-0   Prolene.  Throughout the case, there was no CSF leakage with a watertight   closure.  O-arm was brought in, CT scan was obtained.  Under CT guidance, we   placed pedicle screws bilaterally at L5 and S1, initial hole began with the   Midas Kingsley drill, deepened with the PediGuard, then with a 5.5 mm tap.  All   bone was collected and then placed along the decorticated aspect of the   posterolateral fusion mass of L5 and the sacral ala and the 6.5x50 mm pedicle   screws were placed x4.  Good bone purchase was obtained at all 4 sites.    Lordosis rods were placed in the heads of the pedicle screws, locking nuts   were applied.  We applied a couple millimeters of distraction to further open   the foramen for the L5 roots.  Final tightening was carried out.  A 322 cross   connector was placed and tightened x3.  Grasping instrumentation L5 and S1   moved as a single unit.  I could readily pass the Malik around the bilateral   L5 and S1 pedicles into the distal soft tissues without compromise.  Fat   grafts were placed over the L5 roots.  I placed a piece of  Duragen, some fat   and then a layer of fibrin glue over the dural repair.  Throughout the case,   wound was irrigated with antibiotic irrigation.  A medium Hemovac drain was   placed in depth of wound, brought out through a separate stab incision.  Deep   fascia was closed with #1 Vicryl, subcutaneous fascia with #1 Vicryl,   subcuticular closed with 3-0 Vicryl, and skin edges approximated with   quarter-inch Steri-Strips.  Sterile dressing was placed.  The patient laid   supine on the bed, extubated, taken to the recovery room in satisfactory   condition.  Final sponge, needle counted, counts correct.    ESTIMATED BLOOD LOSS:  500 mL.       ____________________________________     MD GAVIOTA PARDOV / VERONICA    DD:  08/28/2018 17:13:13  DT:  08/28/2018 17:55:03    D#:  [id number]  Job#:  [id number]    cc: Markel Carrion MD

## 2018-08-29 NOTE — DISCHARGE PLANNING
Care Transition Team Assessment    Information Source  Orientation : Oriented x 4  Information Given By: Patient  Who is responsible for making decisions for patient? : Patient    Readmission Evaluation  Is this a readmission?: No    Elopement Risk  Legal Hold: No  Ambulatory or Self Mobile in Wheelchair: No-Not an Elopement Risk  Elopement Risk: Not at Risk for Elopement    Interdisciplinary Discharge Planning  Does Admitting Nurse Feel This Could be a Complex Discharge?: No  Primary Care Physician: Dr. Braswell  Lives with - Patient's Self Care Capacity: Spouse  Patient or legal guardian wants to designate a caregiver (see row info): No  Support Systems: Spouse / Significant Other  Housing / Facility: [address]  Do You Take your Prescribed Medications Regularly: Yes  Able to Return to Previous ADL's: Yes  Mobility Issues: Yes  Prior Services: None  Patient Expects to be Discharged to:: home  Assistance Needed: Yes  Durable Medical Equipment: Walker    Discharge Preparedness  What is your plan after discharge?: Home with help  What are your discharge supports?: Spouse  Prior Functional Level: Ambulatory, Drives Self, Independent with Activities of Daily Living  Difficulity with ADLs: Walking  Difficulity with IADLs: None    Functional Assesment  Prior Functional Level: Ambulatory, Drives Self, Independent with Activities of Daily Living    Finances  Financial Barriers to Discharge: No  Prescription Coverage: No    Vision / Hearing Impairment  Vision Impairment : Yes  Right Eye Vision: Wears Glasses  Left Eye Vision: Wears Glasses  Hearing Impairment : No    Values / Beliefs / Concerns  Values / Beliefs Concerns : No  Spiritual Requests During Hospitalization: No         Domestic Abuse  Have you ever been the victim of abuse or violence?: No  Physical Abuse or Sexual Abuse: No  Verbal Abuse or Emotional Abuse: No  Possible Abuse Reported to:: Not Applicable              Anticipated Discharge  Information  Anticipated discharge disposition: Home

## 2018-08-29 NOTE — DISCHARGE PLANNING
Anticipated Discharge Disposition:   home    Action:   Chart review  Per PT- recommendation is for outpt PT  asessment completed    Barriers to Discharge:   Surgical clearance    Plan:   follow

## 2018-08-29 NOTE — RESPIRATORY CARE
COPD EDUCATION by COPD CLINICAL EDUCATOR  8/29/2018 at 7:43 AM by Olinda Gomes     Patient reviewed by COPD education team. Patient does not qualify for COPD program.

## 2018-08-30 VITALS
SYSTOLIC BLOOD PRESSURE: 101 MMHG | HEART RATE: 71 BPM | OXYGEN SATURATION: 92 % | HEIGHT: 68 IN | WEIGHT: 289.9 LBS | TEMPERATURE: 97.3 F | RESPIRATION RATE: 16 BRPM | DIASTOLIC BLOOD PRESSURE: 53 MMHG | BODY MASS INDEX: 43.94 KG/M2

## 2018-08-30 LAB
ANION GAP SERPL CALC-SCNC: 5 MMOL/L (ref 0–11.9)
BUN SERPL-MCNC: 10 MG/DL (ref 8–22)
CALCIUM SERPL-MCNC: 7.8 MG/DL (ref 8.5–10.5)
CHLORIDE SERPL-SCNC: 104 MMOL/L (ref 96–112)
CO2 SERPL-SCNC: 28 MMOL/L (ref 20–33)
CREAT SERPL-MCNC: 0.81 MG/DL (ref 0.5–1.4)
ERYTHROCYTE [DISTWIDTH] IN BLOOD BY AUTOMATED COUNT: 45.4 FL (ref 35.9–50)
GLUCOSE BLD-MCNC: 117 MG/DL (ref 65–99)
GLUCOSE BLD-MCNC: 127 MG/DL (ref 65–99)
GLUCOSE SERPL-MCNC: 123 MG/DL (ref 65–99)
HCT VFR BLD AUTO: 34.9 % (ref 42–52)
HGB BLD-MCNC: 11.3 G/DL (ref 14–18)
MCH RBC QN AUTO: 29.4 PG (ref 27–33)
MCHC RBC AUTO-ENTMCNC: 32.4 G/DL (ref 33.7–35.3)
MCV RBC AUTO: 90.9 FL (ref 81.4–97.8)
PLATELET # BLD AUTO: 174 K/UL (ref 164–446)
PMV BLD AUTO: 10.4 FL (ref 9–12.9)
POTASSIUM SERPL-SCNC: 3.5 MMOL/L (ref 3.6–5.5)
RBC # BLD AUTO: 3.84 M/UL (ref 4.7–6.1)
SODIUM SERPL-SCNC: 137 MMOL/L (ref 135–145)
WBC # BLD AUTO: 10.8 K/UL (ref 4.8–10.8)

## 2018-08-30 PROCEDURE — 700111 HCHG RX REV CODE 636 W/ 250 OVERRIDE (IP): Performed by: NURSE PRACTITIONER

## 2018-08-30 PROCEDURE — A9270 NON-COVERED ITEM OR SERVICE: HCPCS | Performed by: NURSE PRACTITIONER

## 2018-08-30 PROCEDURE — 97116 GAIT TRAINING THERAPY: CPT

## 2018-08-30 PROCEDURE — 700112 HCHG RX REV CODE 229: Performed by: NURSE PRACTITIONER

## 2018-08-30 PROCEDURE — 36415 COLL VENOUS BLD VENIPUNCTURE: CPT

## 2018-08-30 PROCEDURE — 85027 COMPLETE CBC AUTOMATED: CPT

## 2018-08-30 PROCEDURE — 80048 BASIC METABOLIC PNL TOTAL CA: CPT

## 2018-08-30 PROCEDURE — 700102 HCHG RX REV CODE 250 W/ 637 OVERRIDE(OP): Performed by: NURSE PRACTITIONER

## 2018-08-30 PROCEDURE — 82962 GLUCOSE BLOOD TEST: CPT | Mod: 91

## 2018-08-30 RX ORDER — OXYCODONE HYDROCHLORIDE 10 MG/1
10 TABLET ORAL 3 TIMES DAILY
Qty: 28 TAB | Status: CANCELLED | OUTPATIENT
Start: 2018-08-30

## 2018-08-30 RX ADMIN — BRIMONIDINE TARTRATE 1 DROP: 2 SOLUTION/ DROPS OPHTHALMIC at 06:32

## 2018-08-30 RX ADMIN — GABAPENTIN 300 MG: 300 CAPSULE ORAL at 11:34

## 2018-08-30 RX ADMIN — TIMOLOL MALEATE 1 DROP: 5 SOLUTION OPHTHALMIC at 06:32

## 2018-08-30 RX ADMIN — GABAPENTIN 300 MG: 300 CAPSULE ORAL at 06:32

## 2018-08-30 RX ADMIN — OXYCODONE HYDROCHLORIDE 10 MG: 10 TABLET ORAL at 06:31

## 2018-08-30 RX ADMIN — OXYCODONE HYDROCHLORIDE 10 MG: 10 TABLET ORAL at 01:30

## 2018-08-30 RX ADMIN — CEFAZOLIN SODIUM 2 G: 2 INJECTION, SOLUTION INTRAVENOUS at 06:32

## 2018-08-30 RX ADMIN — DOCUSATE SODIUM 100 MG: 100 CAPSULE, LIQUID FILLED ORAL at 06:31

## 2018-08-30 RX ADMIN — FUROSEMIDE 40 MG: 40 TABLET ORAL at 06:31

## 2018-08-30 ASSESSMENT — PAIN SCALES - GENERAL
PAINLEVEL_OUTOF10: 6
PAINLEVEL_OUTOF10: 6
PAINLEVEL_OUTOF10: 8
PAINLEVEL_OUTOF10: 6
PAINLEVEL_OUTOF10: 4
PAINLEVEL_OUTOF10: 4
PAINLEVEL_OUTOF10: 5

## 2018-08-30 ASSESSMENT — GAIT ASSESSMENTS
GAIT LEVEL OF ASSIST: SUPERVISED
ASSISTIVE DEVICE: 4 WHEEL WALKER
DEVIATION: BRADYKINETIC
DISTANCE (FEET): 200

## 2018-08-30 NOTE — THERAPY
"Physical Therapy Treatment completed.   Bed Mobility:  Supine to Sit: Stand by Assist (slightly elevated HOB and railing. )  Transfers: Sit to Stand: Supervised  Gait: Level Of Assist: Supervised with 4-Wheel Walker       Plan of Care: Will benefit from Physical Therapy 2 times per week  Discharge Recommendations: Equipment: Will Continue to Assess for Equipment Needs.     See \"Rehab Therapy-Acute\" Patient Summary Report for complete documentation.     Pt is s/p L5-S1 surgical intervention and presents with report of incisionl pain. He  performed transfers with supervision and use of 4ww. He did require cues for locking brakes on 4ww, but than demonstrated good carryover rest of tx session. Pt able to increase total gait distance with slow elvin, decreased step lenght but no overt LOB. Pt able to cite and maintian spinal precautions throughout the tx session. Per eval continue to recommend d/c home.  "

## 2018-08-30 NOTE — DISCHARGE INSTRUCTIONS
Ok to shower, pat incision dry, leave open to air- no dressing   No Aspirin or non-steroidal anti-inflammatory medications (aleve, motrin, ibuprofen, celebrex)   No driving for 2 weeks/ No driving while on narcotic medication   Over the counter stool softeners daily while on narcotics   No lifting greater than 15 pounds, no repetitive bending or twisting   Follow up at [facility] 4 weeks after surgery with xrays- may resume plavix 10 days post op    Discharge Instructions    Discharged to home by car with friend. Discharged via wheelchair, hospital escort: Yes.  Special equipment needed: Not Applicable    Be sure to schedule a follow-up appointment with your primary care doctor or any specialists as instructed.     Discharge Plan:   Diet Plan: Discussed  Activity Level: Discussed  Confirmed Follow up Appointment: Patient to Call and Schedule Appointment  Confirmed Symptoms Management: Discussed  Medication Reconciliation Updated: Yes  Pneumococcal Vaccine Administered/Refused: Not given - Patient refused pneumococcal vaccine  Influenza Vaccine Indication: Not indicated: Previously immunized this influenza season and > 8 years of age    I understand that a diet low in cholesterol, fat, and sodium is recommended for good health. Unless I have been given specific instructions below for another diet, I accept this instruction as my diet prescription.   Other diet: Diabetic    Special Instructions: None    · Is patient discharged on Warfarin / Coumadin?   No     Depression / Suicide Risk    As you are discharged from this [facility] facility, it is important to learn how to keep safe from harming yourself.    Recognize the warning signs:  · Abrupt changes in personality, positive or negative- including increase in energy   · Giving away possessions  · Change in eating patterns- significant weight changes-  positive or negative  · Change in sleeping patterns- unable to sleep or sleeping all the  time   · Unwillingness or inability to communicate  · Depression  · Unusual sadness, discouragement and loneliness  · Talk of wanting to die  · Neglect of personal appearance   · Rebelliousness- reckless behavior  · Withdrawal from people/activities they love  · Confusion- inability to concentrate     If you or a loved one observes any of these behaviors or has concerns about self-harm, here's what you can do:  · Talk about it- your feelings and reasons for harming yourself  · Remove any means that you might use to hurt yourself (examples: pills, rope, extension cords, firearm)  · Get professional help from the community (Mental Health, Substance Abuse, psychological counseling)  · Do not be alone:Call your Safe Contact- someone whom you trust who will be there for you.  · Call your local CRISIS HOTLINE 481-5619 or 366-327-0005  · Call your local Children's Mobile Crisis Response Team [place] (824) 230-2727 or www.Predictvia  · Call the toll free National Suicide Prevention Hotlines   · National Suicide Prevention Lifeline 063-159-YOBD (1563)  · National Hope Line Network 800-SUICIDE (558-5682)

## 2018-08-30 NOTE — PROGRESS NOTES
Patient resting quietly in bed, no S/S of distress, A&Ox4. Denies SOB, chest pain, dizziness. Bed alarm on, call light within reach,  pt calls appropriately and does not get out of bed. Bed in lowest position, bed locked, RN and CNA numbers provided, no further needs at this time. No changes from EPIC. Hourly rounding in place. RN offered to call respiratory to set up CPAP, patient refusing at this time.

## 2018-08-30 NOTE — DISCHARGE PLANNING
Anticipated Discharge Disposition:   home    Action:   Pt has a rolator walker  IMM letter given. Explained to pt, pt signed, copy to pt and to chart.  Wife at the bedside. They had no dc concerns.     Barriers to Discharge:   none    Plan:   Home.

## 2018-08-30 NOTE — PROGRESS NOTES
Pt D/C'd.  PIV removed. Discharge instructions provided to pt.  Pt states understanding.  Pt states all questions have been answered.  Copy of discharge provided to pt.  Signed copy in chart. Pt states that all personal belongings are in possession.

## 2018-08-30 NOTE — PROGRESS NOTES
Neurosurgery Progress Note    Subjective:  States LE improved, eating, ambulating voiding    Exam:  BLE str intact- Hvac 80cc    BP  Min: 92/50  Max: 119/56  Pulse  Av.8  Min: 66  Max: 78  Resp  Av.2  Min: 16  Max: 18  Temp  Av.5 °C (97.7 °F)  Min: 35.8 °C (96.5 °F)  Max: 37.1 °C (98.8 °F)  SpO2  Av %  Min: 92 %  Max: 98 %    No Data Recorded    Recent Labs      18   0444  18   0329   WBC  12.6*  10.8   RBC  4.86  3.84*   HEMOGLOBIN  14.0  11.3*   HEMATOCRIT  44.2  34.9*   MCV  90.9  90.9   MCH  28.8  29.4   MCHC  31.7*  32.4*   RDW  46.0  45.4   PLATELETCT  197  174   MPV  10.5  10.4     Recent Labs      18   0329   SODIUM  139  137   POTASSIUM  4.0  3.5*   CHLORIDE  105  104   CO2  27  28   GLUCOSE  141*  123*   BUN  12  10   CREATININE  0.68  0.81   CALCIUM  8.2*  7.8*               Intake/Output       18 0700 - 18 0659 18 07 - 18 0659      [id number] 2419-8991 Total  1765-1240 Total       Intake    I.V.  46  -- 46  --  -- --    PCA End of Shift Total Volume (ml) 46 -- 46 -- -- --    Total Intake 46 -- 46 -- -- --       Output    Urine  1500  400 1900  --  -- --    Urine Void (mL) (non-catheter) -- 400 400 -- -- --    Output (mL) ([REMOVED] Urinary Catheter Indwelling Catheter 16f) 1500 -- 1500 -- -- --    Drains  200  125 325  --  -- --    Output (ml) ([REMOVED] Surgical Drain Group Back Hemovac 1 (A)) 200 125 325 -- -- --    Total Output [phone] -- -- --       Net I/O     -[phone] -[phone] -[phone] -- -- --            Intake/Output Summary (Last 24 hours) at 08/30/18 1017  Last data filed at 18 0400   Gross per 24 hour   Intake                0 ml   Output             2225 ml   Net            -2225 ml            • oxyCODONE immediate-release  10 mg Q4HRS PRN   • Pharmacy Consult Request  1 Each PRN   • MD ALERT...DO NOT ADMINISTER NSAIDS or ASPIRIN unless ORDERED By Neurosurgery  1 Each PRN   • docusate sodium  100 mg BID    • senna-docusate  1 Tab Nightly   • senna-docusate  1 Tab Q24HRS PRN   • polyethylene glycol/lytes  1 Packet BID PRN   • magnesium hydroxide  30 mL QDAY PRN   • bisacodyl  10 mg Q24HRS PRN   • fleet  1 Each Once PRN   • diphenhydrAMINE  25 mg Q6HRS PRN    Or   • diphenhydrAMINE  25 mg Q6HRS PRN   • ondansetron  4 mg Q4HRS PRN   • ondansetron  4 mg Q4HRS PRN   • cyclobenzaprine  10 mg Q8HRS PRN   • labetalol  10 mg Q HOUR PRN   • hydrALAZINE  10 mg Q HOUR PRN   • cloNIDine  0.2 mg BID   • furosemide  40 mg BID   • gabapentin  300 mg TID   • glipiZIDE  10 mg AC PM MEAL   • imipramine  100 mg Nightly   • losartan  50 mg BID   • metoprolol SR  50 mg Q EVENING   • simvastatin  40 mg Nightly   • insulin regular  1-6 Units 4X/DAY ACHS    And   • glucose 4 g  16 g Q15 MIN PRN    And   • dextrose 50%  25 mL Q15 MIN PRN   • ceFAZolin  2 g Q8HRS   • brimonidine  1 Drop BID   • timolol  1 Drop BID   • Pain Pump   Continuous       Assessment and Plan:  POD #2 L5-S1 fusion  Prophylactic anticoagulation: no         Start date/time: tbd    Plan:  SC home

## 2018-08-30 NOTE — DISCHARGE SUMMARY
DATE OF ADMISSION:  08/28/2018    DATE OF POSSIBLE DISCHARGE:  08/30/2018    OPERATION PERFORMED:  L5-S1 fusion with Dr. Miller on 08/28/2018.    HOSPITAL COURSE:  On date of admission, patient was brought to the OR.  He was   prepped and draped in sterile fashion.  Operation performed.  He does have a   significant pain medication history.  He has an implanted pump with Dilaudid,   fentanyl, tenemine, clonidine, and bupivacaine.  He was quite sedated   immediately postop.  Dr. Richardson does his pain management.  He normally takes 3   oxycodone 10 on top of the pain pump at home and has just recently had this   filled.  He does state that his legs are better.  He is eating, ambulating,   and voiding.  He wants to go home.  He did have a durotomy intraoperatively.    His strength is intact.  His incision is clean, dry, and intact.    DISCHARGE INSTRUCTIONS:  Discharge instructions were given to patient in paper   format.    DISCHARGE MEDICATIONS:  Patient has oxycodone at home; therefore, he will use   it he has.  First, I have told him he could use up to 6 in a day and he may   call our office for short term refill to bridge him for his next prescription.    ASSESSMENT AND PLAN:  1.  Status post above-delineated surgery with Dr. Miller on 08/28/2018.  2.  Patient will follow up in our office in 4 weeks with x-rays.    Should the patient have further questions or concerns, they will not hesitate   to give our office a call.       ____________________________________     JERONIMO Henao / VERONICA    DD:  08/30/2018 10:35:07  DT:  08/30/2018 11:05:57    D#:  [id number]  Job#:  [id number]

## 2018-08-30 NOTE — CARE PLAN
Problem: Knowledge Deficit  Goal: Knowledge of disease process/condition, treatment plan, diagnostic tests, and medications will improve  POC: hemovac DC'd    Problem: Discharge Barriers/Planning  Goal: Patient's continuum of care needs will be met  DC home today

## 2020-01-23 ENCOUNTER — HOSPITAL ENCOUNTER (OUTPATIENT)
Dept: RADIOLOGY | Facility: MEDICAL CENTER | Age: 70
End: 2020-01-23
Attending: NEUROLOGICAL SURGERY
Payer: MEDICARE

## 2020-01-23 DIAGNOSIS — M48.062 SPINAL STENOSIS, LUMBAR REGION, WITH NEUROGENIC CLAUDICATION: ICD-10-CM

## 2020-01-23 PROCEDURE — A9576 INJ PROHANCE MULTIPACK: HCPCS | Performed by: NEUROLOGICAL SURGERY

## 2020-01-23 PROCEDURE — 72197 MRI PELVIS W/O & W/DYE: CPT

## 2020-01-23 PROCEDURE — 72131 CT LUMBAR SPINE W/O DYE: CPT

## 2020-01-23 PROCEDURE — 700117 HCHG RX CONTRAST REV CODE 255: Performed by: NEUROLOGICAL SURGERY

## 2020-01-23 RX ADMIN — GADOTERIDOL 27 ML: 279.3 INJECTION, SOLUTION INTRAVENOUS at 15:14

## 2021-08-20 ENCOUNTER — APPOINTMENT (OUTPATIENT)
Dept: MEDICAL GROUP | Facility: IMAGING CENTER | Age: 71
End: 2021-08-20

## 2021-08-27 ENCOUNTER — APPOINTMENT (OUTPATIENT)
Dept: MEDICAL GROUP | Facility: IMAGING CENTER | Age: 71
End: 2021-08-27

## 2021-08-30 ENCOUNTER — APPOINTMENT (OUTPATIENT)
Dept: MEDICAL GROUP | Facility: IMAGING CENTER | Age: 71
End: 2021-08-30

## 2021-09-03 ENCOUNTER — APPOINTMENT (OUTPATIENT)
Dept: MEDICAL GROUP | Facility: IMAGING CENTER | Age: 71
End: 2021-09-03

## 2021-09-10 PROBLEM — M16.11 ARTHRITIS OF RIGHT HIP: Status: ACTIVE | Noted: 2021-09-10

## 2024-03-27 ENCOUNTER — APPOINTMENT (OUTPATIENT)
Dept: RADIOLOGY | Facility: MEDICAL CENTER | Age: 74
End: 2024-03-27
Attending: INTERNAL MEDICINE
Payer: MEDICARE

## 2024-04-11 ENCOUNTER — HOSPITAL ENCOUNTER (OUTPATIENT)
Dept: RADIOLOGY | Facility: MEDICAL CENTER | Age: 74
End: 2024-04-11
Attending: INTERNAL MEDICINE
Payer: MEDICARE

## 2024-04-11 DIAGNOSIS — K59.03 THERAPEUTIC OPIOID INDUCED CONSTIPATION: ICD-10-CM

## 2024-04-11 DIAGNOSIS — T40.2X5A THERAPEUTIC OPIOID INDUCED CONSTIPATION: ICD-10-CM

## 2024-04-11 PROCEDURE — 74177 CT ABD & PELVIS W/CONTRAST: CPT

## 2024-04-11 PROCEDURE — 700117 HCHG RX CONTRAST REV CODE 255: Performed by: INTERNAL MEDICINE

## 2024-04-11 RX ADMIN — IOHEXOL 100 ML: 350 INJECTION, SOLUTION INTRAVENOUS at 14:20

## 2024-04-11 RX ADMIN — IOHEXOL 25 ML: 240 INJECTION, SOLUTION INTRATHECAL; INTRAVASCULAR; INTRAVENOUS; ORAL at 14:51

## 2025-06-05 ENCOUNTER — APPOINTMENT (OUTPATIENT)
Dept: ADMISSIONS | Facility: MEDICAL CENTER | Age: 75
End: 2025-06-05
Attending: PAIN MEDICINE
Payer: MEDICARE

## 2025-06-06 ENCOUNTER — PRE-ADMISSION TESTING (OUTPATIENT)
Dept: ADMISSIONS | Facility: MEDICAL CENTER | Age: 75
End: 2025-06-06
Attending: PAIN MEDICINE
Payer: MEDICARE

## 2025-06-06 VITALS — HEIGHT: 68 IN | BODY MASS INDEX: 32.69 KG/M2

## 2025-06-06 RX ORDER — LEVOTHYROXINE SODIUM 75 UG/1
75 TABLET ORAL DAILY
COMMUNITY

## 2025-06-06 RX ORDER — METFORMIN HYDROCHLORIDE 500 MG/1
1000 TABLET, EXTENDED RELEASE ORAL 2 TIMES DAILY
COMMUNITY

## 2025-06-06 RX ORDER — POTASSIUM CHLORIDE 750 MG/1
10 TABLET, EXTENDED RELEASE ORAL DAILY
COMMUNITY
End: 2025-06-06

## 2025-06-06 RX ORDER — TAMSULOSIN HYDROCHLORIDE 0.4 MG/1
0.4 CAPSULE ORAL DAILY
COMMUNITY
Start: 2025-05-12

## 2025-06-06 RX ORDER — LATANOPROST 50 UG/ML
1 SOLUTION/ DROPS OPHTHALMIC NIGHTLY
COMMUNITY

## 2025-06-06 RX ORDER — PREGABALIN 100 MG/1
100 CAPSULE ORAL 2 TIMES DAILY
COMMUNITY

## 2025-06-06 RX ORDER — LOSARTAN POTASSIUM 25 MG/1
25 TABLET ORAL DAILY
COMMUNITY

## 2025-06-06 RX ORDER — CHLORTHALIDONE 25 MG/1
25 TABLET ORAL DAILY
COMMUNITY

## 2025-06-06 RX ORDER — EZETIMIBE 10 MG/1
10 TABLET ORAL NIGHTLY
COMMUNITY

## 2025-06-06 RX ORDER — ROSUVASTATIN CALCIUM 40 MG/1
40 TABLET, COATED ORAL NIGHTLY
COMMUNITY

## 2025-06-06 RX ORDER — POTASSIUM CHLORIDE 750 MG/1
10 CAPSULE, EXTENDED RELEASE ORAL 2 TIMES DAILY
COMMUNITY

## 2025-06-06 RX ORDER — APIXABAN 5 MG/1
1 TABLET, FILM COATED ORAL 2 TIMES DAILY
COMMUNITY

## 2025-06-06 RX ORDER — METOPROLOL SUCCINATE 25 MG/1
25 TABLET, EXTENDED RELEASE ORAL DAILY
COMMUNITY
Start: 2025-05-19

## 2025-06-06 RX ORDER — PNV NO.95/FERROUS FUM/FOLIC AC 28MG-0.8MG
65 TABLET ORAL
COMMUNITY

## 2025-06-06 NOTE — PREADMIT AVS NOTE
Current Medications   Medication Instructions    losartan (COZAAR) 25 MG Tab Stop 24 hours before surgery    metoprolol SR (TOPROL XL) 25 MG TABLET SR 24 HR Continue taking medication as prescribed, including morning of procedure     tamsulosin (FLOMAX) 0.4 MG capsule Continue taking medication as prescribed, including morning of procedure     Ascorbic Acid (VITAMIN C PO) Stop 7 days before surgery    Omega-3 Fatty Acids (OMEGA 3 PO) Stop 7 days before surgery    chlorthalidone (HYGROTON) 25 MG Tab Hold medication day of procedure    ELIQUIS 5 MG Tab Follow instructions from surgeon or specialist.    ezetimibe (ZETIA) 10 MG Tab Stop 24 hours before surgery    latanoprost (XALATAN) 0.005 % Solution Continue taking medication as prescribed, including morning of procedure     levothyroxine (SYNTHROID) 75 MCG Tab Continue taking medication as prescribed, including morning of procedure     metFORMIN ER (GLUCOPHAGE XR) 500 MG TABLET SR 24 HR Hold medication day of procedure    potassium chloride (MICRO-K) 10 MEQ capsule Hold medication day of procedure    pregabalin (LYRICA) 100 MG Cap Continue taking medication as prescribed, including morning of procedure     rosuvastatin (CRESTOR) 40 MG tablet Continue taking medication as prescribed, including morning of procedure     Ferrous Sulfate (IRON) 325 (65 Fe) MG Tab Stop 7 days before surgery    oxyCODONE immediate release (ROXICODONE) 10 MG immediate release tablet As needed medication, may take if needed, including morning of procedure     docusate sodium (STOOL SOFTENER) 100 MG CAPS As needed medication, may take if needed, including morning of procedure     Multiple Vitamin (ONE-A-DAY MENS PO) Stop 7 days before surgery

## 2025-06-06 NOTE — PREPROCEDURE INSTRUCTIONS
PreAdmit Telephone Appointment: Reviewed the Preparing for your procedure handout with patient. Patient instructed per pharmacy guidelines regarding taking, holding or contacting provider for instructions on regularly prescribed medications before surgery.    Confirmed with patient where to check in morning of surgery.     EKG requested from Dr. Ignacio's office. Confirmed with office done in April.  Labs requested from [facility]. Confirmed with office labs done May 12th.

## 2025-06-12 ENCOUNTER — APPOINTMENT (OUTPATIENT)
Dept: RADIOLOGY | Facility: MEDICAL CENTER | Age: 75
End: 2025-06-12
Attending: PAIN MEDICINE
Payer: MEDICARE

## 2025-06-12 ENCOUNTER — ANESTHESIA EVENT (OUTPATIENT)
Dept: SURGERY | Facility: MEDICAL CENTER | Age: 75
End: 2025-06-12
Payer: MEDICARE

## 2025-06-12 ENCOUNTER — HOSPITAL ENCOUNTER (OUTPATIENT)
Facility: MEDICAL CENTER | Age: 75
End: 2025-06-12
Attending: PAIN MEDICINE | Admitting: PAIN MEDICINE
Payer: MEDICARE

## 2025-06-12 ENCOUNTER — ANESTHESIA (OUTPATIENT)
Dept: SURGERY | Facility: MEDICAL CENTER | Age: 75
End: 2025-06-12
Payer: MEDICARE

## 2025-06-12 VITALS
RESPIRATION RATE: 16 BRPM | OXYGEN SATURATION: 96 % | DIASTOLIC BLOOD PRESSURE: 55 MMHG | HEIGHT: 68 IN | SYSTOLIC BLOOD PRESSURE: 114 MMHG | TEMPERATURE: 97.5 F | HEART RATE: 64 BPM | WEIGHT: 237.77 LBS | BODY MASS INDEX: 36.04 KG/M2

## 2025-06-12 LAB — GLUCOSE BLD STRIP.AUTO-MCNC: 122 MG/DL (ref 65–99)

## 2025-06-12 PROCEDURE — 160193 HCHG PACU STANDARD - 1ST 60 MINS: Performed by: PAIN MEDICINE

## 2025-06-12 PROCEDURE — 160002 HCHG RECOVERY MINUTES (STAT): Performed by: PAIN MEDICINE

## 2025-06-12 PROCEDURE — 700105 HCHG RX REV CODE 258: Performed by: PAIN MEDICINE

## 2025-06-12 PROCEDURE — 160015 HCHG STAT PREOP MINUTES: Performed by: PAIN MEDICINE

## 2025-06-12 PROCEDURE — 160041 HCHG SURGERY MINUTES - EA ADDL 1 MIN LEVEL 4: Performed by: PAIN MEDICINE

## 2025-06-12 PROCEDURE — 160029 HCHG SURGERY MINUTES - 1ST 30 MINS LEVEL 4: Performed by: PAIN MEDICINE

## 2025-06-12 PROCEDURE — 700101 HCHG RX REV CODE 250: Performed by: ANESTHESIOLOGY

## 2025-06-12 PROCEDURE — 160025 RECOVERY II MINUTES (STATS): Performed by: PAIN MEDICINE

## 2025-06-12 PROCEDURE — 72020 X-RAY EXAM OF SPINE 1 VIEW: CPT

## 2025-06-12 PROCEDURE — 82962 GLUCOSE BLOOD TEST: CPT | Performed by: PAIN MEDICINE

## 2025-06-12 PROCEDURE — 160048 HCHG OR STATISTICAL LEVEL 1-5: Performed by: PAIN MEDICINE

## 2025-06-12 PROCEDURE — 160192 HCHG ANESTHESIA COMPLEX: Performed by: PAIN MEDICINE

## 2025-06-12 PROCEDURE — 700111 HCHG RX REV CODE 636 W/ 250 OVERRIDE (IP): Mod: JZ | Performed by: ANESTHESIOLOGY

## 2025-06-12 PROCEDURE — 160046 HCHG PACU - 1ST 60 MINS PHASE II: Performed by: PAIN MEDICINE

## 2025-06-12 PROCEDURE — 700111 HCHG RX REV CODE 636 W/ 250 OVERRIDE (IP): Mod: JZ | Performed by: PAIN MEDICINE

## 2025-06-12 RX ORDER — ONDANSETRON 2 MG/ML
4 INJECTION INTRAMUSCULAR; INTRAVENOUS
Status: DISCONTINUED | OUTPATIENT
Start: 2025-06-12 | End: 2025-06-12 | Stop reason: HOSPADM

## 2025-06-12 RX ORDER — DIPHENHYDRAMINE HYDROCHLORIDE 50 MG/ML
12.5 INJECTION, SOLUTION INTRAMUSCULAR; INTRAVENOUS
Status: DISCONTINUED | OUTPATIENT
Start: 2025-06-12 | End: 2025-06-12 | Stop reason: HOSPADM

## 2025-06-12 RX ORDER — HYDROMORPHONE HYDROCHLORIDE 1 MG/ML
0.5 INJECTION, SOLUTION INTRAMUSCULAR; INTRAVENOUS; SUBCUTANEOUS
Status: DISCONTINUED | OUTPATIENT
Start: 2025-06-12 | End: 2025-06-12 | Stop reason: HOSPADM

## 2025-06-12 RX ORDER — SODIUM CHLORIDE, SODIUM LACTATE, POTASSIUM CHLORIDE, CALCIUM CHLORIDE 600; 310; 30; 20 MG/100ML; MG/100ML; MG/100ML; MG/100ML
INJECTION, SOLUTION INTRAVENOUS CONTINUOUS
Status: DISCONTINUED | OUTPATIENT
Start: 2025-06-12 | End: 2025-06-12 | Stop reason: HOSPADM

## 2025-06-12 RX ORDER — IPRATROPIUM BROMIDE AND ALBUTEROL SULFATE 2.5; .5 MG/3ML; MG/3ML
3 SOLUTION RESPIRATORY (INHALATION)
Status: DISCONTINUED | OUTPATIENT
Start: 2025-06-12 | End: 2025-06-12 | Stop reason: HOSPADM

## 2025-06-12 RX ORDER — HYDROMORPHONE HYDROCHLORIDE 1 MG/ML
0.2 INJECTION, SOLUTION INTRAMUSCULAR; INTRAVENOUS; SUBCUTANEOUS
Status: DISCONTINUED | OUTPATIENT
Start: 2025-06-12 | End: 2025-06-12 | Stop reason: HOSPADM

## 2025-06-12 RX ORDER — MIDAZOLAM HYDROCHLORIDE 1 MG/ML
INJECTION INTRAMUSCULAR; INTRAVENOUS PRN
Status: DISCONTINUED | OUTPATIENT
Start: 2025-06-12 | End: 2025-06-12 | Stop reason: SURG

## 2025-06-12 RX ORDER — MIDAZOLAM HYDROCHLORIDE 1 MG/ML
1 INJECTION INTRAMUSCULAR; INTRAVENOUS
Status: DISCONTINUED | OUTPATIENT
Start: 2025-06-12 | End: 2025-06-12 | Stop reason: HOSPADM

## 2025-06-12 RX ORDER — OXYCODONE HCL 5 MG/5 ML
5 SOLUTION, ORAL ORAL
Status: DISCONTINUED | OUTPATIENT
Start: 2025-06-12 | End: 2025-06-12 | Stop reason: HOSPADM

## 2025-06-12 RX ORDER — MEPERIDINE HYDROCHLORIDE 25 MG/ML
25 INJECTION INTRAMUSCULAR; INTRAVENOUS; SUBCUTANEOUS
Status: DISCONTINUED | OUTPATIENT
Start: 2025-06-12 | End: 2025-06-12 | Stop reason: HOSPADM

## 2025-06-12 RX ORDER — LIDOCAINE HYDROCHLORIDE 20 MG/ML
INJECTION, SOLUTION EPIDURAL; INFILTRATION; INTRACAUDAL; PERINEURAL PRN
Status: DISCONTINUED | OUTPATIENT
Start: 2025-06-12 | End: 2025-06-12 | Stop reason: SURG

## 2025-06-12 RX ORDER — OXYCODONE HCL 5 MG/5 ML
10 SOLUTION, ORAL ORAL
Status: DISCONTINUED | OUTPATIENT
Start: 2025-06-12 | End: 2025-06-12 | Stop reason: HOSPADM

## 2025-06-12 RX ORDER — HYDROMORPHONE HYDROCHLORIDE 1 MG/ML
0.1 INJECTION, SOLUTION INTRAMUSCULAR; INTRAVENOUS; SUBCUTANEOUS
Status: DISCONTINUED | OUTPATIENT
Start: 2025-06-12 | End: 2025-06-12 | Stop reason: HOSPADM

## 2025-06-12 RX ADMIN — LIDOCAINE HYDROCHLORIDE 20 MG: 20 INJECTION, SOLUTION EPIDURAL; INFILTRATION; INTRACAUDAL; PERINEURAL at 07:41

## 2025-06-12 RX ADMIN — MIDAZOLAM HYDROCHLORIDE 2 MG: 1 INJECTION, SOLUTION INTRAMUSCULAR; INTRAVENOUS at 07:37

## 2025-06-12 RX ADMIN — FENTANYL CITRATE 50 MCG: 50 INJECTION, SOLUTION INTRAMUSCULAR; INTRAVENOUS at 07:41

## 2025-06-12 RX ADMIN — FENTANYL CITRATE 50 MCG: 50 INJECTION, SOLUTION INTRAMUSCULAR; INTRAVENOUS at 07:54

## 2025-06-12 RX ADMIN — SODIUM CHLORIDE, POTASSIUM CHLORIDE, SODIUM LACTATE AND CALCIUM CHLORIDE: 600; 310; 30; 20 INJECTION, SOLUTION INTRAVENOUS at 07:37

## 2025-06-12 ASSESSMENT — PAIN DESCRIPTION - PAIN TYPE
TYPE: ACUTE PAIN

## 2025-06-12 ASSESSMENT — PAIN SCALES - GENERAL: PAIN_LEVEL: 2

## 2025-06-12 ASSESSMENT — FIBROSIS 4 INDEX: FIB4 SCORE: 2.1

## 2025-06-12 NOTE — DISCHARGE INSTRUCTIONS
What to Expect Post Anesthesia    Rest and take it easy for the first 24 hours.  A responsible adult is recommended to remain with you during that time.  It is normal to feel sleepy.  We encourage you to not do anything that requires balance, judgment or coordination.    FOR 24 HOURS DO NOT:  Drive, operate machinery or run household appliances.  Drink beer or alcoholic beverages.  Make important decisions or sign legal documents.    To avoid nausea, slowly advance diet as tolerated, avoiding spicy or greasy foods for the first day.  Add more substantial food to your diet according to your provider's instructions.  Babies can be fed formula or breast milk as soon as they are hungry.  INCREASE FLUIDS AND FIBER TO AVOID CONSTIPATION.    MILD FLU-LIKE SYMPTOMS ARE NORMAL.  YOU MAY EXPERIENCE GENERALIZED MUSCLE ACHES, THROAT IRRITATION, HEADACHE AND/OR SOME NAUSEA.    If any questions arise, call your provider.  If your provider is not available, please feel free to call the [facility] at [phone].    MEDICATIONS: Resume taking daily medication.  Take prescribed pain medication with food.  If no medication is prescribed, you may take non-aspirin pain medication if needed.  PAIN MEDICATION CAN BE VERY CONSTIPATING.  Take a stool softener or laxative such as senokot, pericolace, or milk of magnesia if needed.    Last pain medication given at:  None given    May resume a regular diet  May resume regular activity  Ok to use ice over injections site  Okay to shower 72 hours after procedure.   Follow up with Dr Richardson in 1-2 weeks, call with any questions/concerns [phone]

## 2025-06-12 NOTE — OR NURSING
0915: Patient arrived to phase 2. Patient changed out of surgical gown into clothes. Patient is A&Ox4. Patient reports no pain and no nausea. Vital signs taken and patient assessed. Asked the patient if they had to use the bathroom.    Discharge instructions read. All questions answered.    IV removed. CNA transported the patient to  ride via wheelchair. Discharged to care of responsible adult. All belongings with the patient.    1106: Patient has stayed for three hours after injection, patient report no adverse side affects. No pain, no nausea. Patient is okay to discharge home.

## 2025-06-12 NOTE — OR NURSING
0808- Pt to pacu via gurney with side rails up.  VSS, pt awake, respirations spontaneous and unlabored.  Pt denies pain, nausea. No dressing to back, cdi.   0820- Pt denies pain, nausea.  Pt on STOP BANG hold, will monitor x 1 hr.  0835- VSS. Pt tolerating PO water. No changes.  0850- VSS. Pt awake, no changes. Wife was called for update, no answer.  0905- VSS. Awaiting stop bang hold  0912- Pt meets stage 2 criteria, report to Mayte DIEGO.

## 2025-06-12 NOTE — ANESTHESIA TIME REPORT
Anesthesia Start and Stop Event Times       Date Time Event    6/12/2025 0723 Ready for Procedure     0737 Anesthesia Start     0810 Anesthesia Stop          Responsible Staff  06/12/25      Name Role Begin End    Joaquín Vega M.D. Anesth 0737 0810          Overtime Reason:  no overtime (within assigned shift)    Comments:                                                          
68

## 2025-06-12 NOTE — ANESTHESIA PREPROCEDURE EVALUATION
Case: [id number] Date/Time: 06/12/25 0715    Procedure: SINGLE SHOT INTRATHECAL PAIN PUMP TRIAL WITH PUMP MYELOGRAM    Pre-op diagnosis: FAILED BACK SURGICAL SYNDROME    Location:  OR  / SURGERY [facility]    Surgeons: John Richardson M.D.            Relevant Problems   CARDIAC   (positive) Essential hypertension, benign      Other   (positive) Arthritis of right hip       Physical Exam    Airway   Mallampati: II  TM distance: >3 FB  Neck ROM: full       Cardiovascular - normal exam  Rhythm: regular  Rate: normal    (-) murmur     Dental - normal exam           Pulmonary - normal examBreath sounds clear to auscultation     Abdominal    Neurological - normal exam                   Anesthesia Plan    ASA 3       Plan - general       Airway plan will be natural airway          Induction: intravenous    Postoperative Plan: Postoperative administration of opioids is intended.    Pertinent diagnostic labs and testing reviewed    Informed Consent:    Anesthetic plan and risks discussed with patient.    Use of blood products discussed with: patient whom consented to blood products.

## 2025-06-12 NOTE — PROCEDURES
DATE OF PROCEDURE:  06/12/2025     NAME OF PROCEDURE:  Spinal narcotic trial.     PREPROCEDURAL DIAGNOSES:  The patient has failed back surgery syndrome with   intractable low back pain, unrelieved by conservative management, for   consideration of [employer]tronic intrathecal narcotic infusion pump.     POSTPROCEDURAL DIAGNOSIS:   The patient has failed back surgery syndrome with   intractable low back pain, unrelieved by conservative management, for   consideration of Medtronic intrathecal narcotic infusion pump.     PROCEDURE PERFORMED BY:  John Richardson MD     ANESTHESIA:  Local standby with mild sedation.     ANESTHESIOLOGIST:  Dr. Joaquín Vega.     DESCRIPTION OF PROCEDURE:  The patient was brought into the preop holding   area.  The procedure itself and possible complications have been discussed.    He was then brought to the operating room and placed in a prone position.  The   back was sterilely prepped using a surgical prep set.  A lidocaine skin wheal   was raised over the L1-L2 interspace.  Using needle through-the-needle   technique, an 18-gauge 1.5 inch, and a 3.5-inch 25-gauge Francisco, the   Francisco was advanced into the intrathecal space.  5 mL of radiographic   contrast confirmed a normal-appearing myelogram.  This was followed by 7 mL   containing 75 mcg of fentanyl and preserved-free normal saline.  Prior to   removal of the needle, an additional 1 mL of contrast confirmed no migration   of the needle with further evidence of a myelogram.  The needles were removed.    The patient was brought to the recovery room.  He will be observed for the   next several hours and discharge home in good condition.  The patient, of   note, within 10 minutes, noted greater than 80% improvement in his pain   overall.        ______________________________  MD HAILE Britton/ADRIANA    DD:  06/12/2025 08:23  DT:  06/12/2025 09:03    Job#:  [id number]

## 2025-06-12 NOTE — OR SURGEON
Immediate Post OP Note    * No Diagnosis Codes entered *  Failed back surgery syndrome    * No Diagnosis Codes entered *  As above    Procedure(s):  SINGLE SHOT INTRATHECAL PAIN PUMP TRIAL WITH PUMP MYELOGRAM    Surgeon(s):  John Richardson M.D.    Anesthesiologist/Type of Anesthesia:  Anesthesiologist: Joaquín Vega M.D./* No anesthesia type entered *    Surgical Staff:  Circulator: Jean Nieto R.N.  Relief Circulator: Bernie Monet R.N.  Scrub Person: Umm Doe  Radiology Technologist: Preethi Bennett    Specimens removed if any:  * No specimens in log *    Estimated Blood Loss: None    Findings: None    Complications: None        6/12/2025 8:11 AM John Richardson M.D.

## 2025-06-12 NOTE — ANESTHESIA POSTPROCEDURE EVALUATION
Patient: Erickson Fletcher    Procedure Summary       Date: 06/12/25 Room / Location:  OR  / SURGERY [facility]    Anesthesia Start: 0737 Anesthesia Stop: 0810    Procedure: SINGLE SHOT INTRATHECAL PAIN PUMP TRIAL WITH PUMP MYELOGRAM (Back) Diagnosis: (FAILED BACK SURGICAL SYNDROME)    Surgeons: John Richardson M.D. Responsible Provider: Joaquín Vega M.D.    Anesthesia Type: general ASA Status: 3            Final Anesthesia Type: general  Last vitals  BP   Blood Pressure : 114/58    Temp   36.8 °C (98.2 °F)    Pulse   (!) 57   Resp   14    SpO2   98 %      Anesthesia Post Evaluation    Patient location during evaluation: PACU  Patient participation: complete - patient participated  Level of consciousness: awake and alert  Pain score: 2    Airway patency: patent  Anesthetic complications: no  Cardiovascular status: hemodynamically stable  Respiratory status: acceptable  Hydration status: euvolemic    PONV: none          There were no known notable events for this encounter.     Nurse Pain Score: 0 (NPRS)

## (undated) DEVICE — COVER MAYO STAND X-LG - (22EA/CA)

## (undated) DEVICE — SUTURE 6-0 PROLENE BV-1 D/A 24 (36PK/BX)"

## (undated) DEVICE — DRAPE LARGE 3 QUARTER - (20/CA)

## (undated) DEVICE — BOVIE NEEDLE TIP INSULATD NON-SAFETY 2CM (50/PK)

## (undated) DEVICE — SENSOR OXIMETER ADULT SPO2 RD SET (20EA/BX)

## (undated) DEVICE — PEN SKIN MARKER W/RULER - (50EA/BX)

## (undated) DEVICE — HUMID-VENT HEAT AND MOISTURE EXCHANGE- (50/BX)

## (undated) DEVICE — GLOVE BIOGEL INDICATOR SZ 7SURGICAL PF LTX - (50/BX 4BX/CA)

## (undated) DEVICE — STAPLER SKIN DISP - (6/BX 10BX/CA) VISISTAT

## (undated) DEVICE — DRAPE LAPAROTOMY T SHEET - (12EA/CA)

## (undated) DEVICE — SUCTION INSTRUMENT YANKAUER BULBOUS TIP W/O VENT (50EA/CA)

## (undated) DEVICE — CLEANER ELECTRO-SURGICAL TIP - (25/BX 4BX/CA)

## (undated) DEVICE — MEDICINE CUP STERILE 2 OZ (100/CA)

## (undated) DEVICE — LACTATED RINGERS INJ 1000 ML - (14EA/CA 60CA/PF)

## (undated) DEVICE — SUTURE 0 ETHIBOND MO6 C/R - (12/BX) 8-18 INCH ETHICON

## (undated) DEVICE — TUBE CONNECTING SUCTION - CLEAR PLASTIC STERILE 72 IN (50EA/CA)

## (undated) DEVICE — MASK ANESTHESIA ADULT  - (100/CA)

## (undated) DEVICE — SUTURE 2-0 VICRYL PLUS CT-1 - 8 X 18 INCH(12/BX)

## (undated) DEVICE — TOWEL STOP TIMEOUT SAFETY FLAG (40EA/CA)

## (undated) DEVICE — KIT EVACUATER 3 SPRING PVC LF 1/8 DRAIN SIZE (10EA/CA)"

## (undated) DEVICE — BLADE SURGICAL #15 - (50/BX 3BX/CA)

## (undated) DEVICE — APPLICATOR COTTONTIP 3 IN - STERILE (10EA/PK 100PK/CA)

## (undated) DEVICE — CANISTER SUCTION RIGID RED 1500CC (40EA/CA)

## (undated) DEVICE — SUTURE 3-0 VICRYL PLUS SH - 8X 18 INCH (12/BX)

## (undated) DEVICE — MIDAS LUBRICATOR DIFFUSER PACK (4EA/CA)

## (undated) DEVICE — CHLORAPREP 26 ML APPLICATOR - ORANGE TINT(25/CA)

## (undated) DEVICE — LACTATED RINGERS INJ. 500 ML - (24EA/CA)

## (undated) DEVICE — SUTURE 1 VICRYL PLUS CTX - 8 X 18 INCH (12/BX)

## (undated) DEVICE — GLOVE BIOGEL PI ORTHO SZ 6 1/2 SURGICAL PF LF (40PR/BX)

## (undated) DEVICE — DRAPE 36X28IN RAD CARM BND BG - (25/CA) O

## (undated) DEVICE — ELECTRODE DUAL RETURN W/ CORD - (50/PK)

## (undated) DEVICE — PROTECTOR ULNA NERVE - (36PR/CA)

## (undated) DEVICE — SYRINGE 10 ML CONTROL LL (25EA/BX 4BX/CA)

## (undated) DEVICE — GLOVE BIOGEL SZ 6.5 SURGICAL PF LTX (50PR/BX 4BX/CA)

## (undated) DEVICE — TUBING CLEARLINK DUO-VENT - C-FLO (48EA/CA)

## (undated) DEVICE — BOVIE BLADE SHORT - (150EA/CT)

## (undated) DEVICE — APPLICATOR COTTON TIP 6 IN - STERILE (2EA/PK 100PK/BX)

## (undated) DEVICE — PACK JACKSON TABLE KIT W/OUT - HR (6EA/CA)

## (undated) DEVICE — PEDIGUARD 2.5MM XS SPINEGUARD TRI TIP

## (undated) DEVICE — KIT ANESTHESIA W/CIRCUIT & 3/LT BAG W/FILTER (20EA/CA)

## (undated) DEVICE — CANISTER SUCTION 3000ML MECHANICAL FILTER AUTO SHUTOFF MEDI-VAC NONSTERILE LF DISP  (40EA/CA)

## (undated) DEVICE — KIT SURGIFLO W/OUT THROMBIN - (6EA/CA)

## (undated) DEVICE — SUTURE GENERAL

## (undated) DEVICE — GLOVE BIOGEL SZ 7.5 SURGICAL PF LTX - (50PR/BX 4BX/CA)

## (undated) DEVICE — GLOVE SZ 6.5 BIOGEL PI MICRO - PF LF (50PR/BX)

## (undated) DEVICE — SPONGE GAUZESTER 4 X 4 4PLY - (128PK/CA)

## (undated) DEVICE — SUTURE 3-0 VICRYL PLUS RB-1 - 8 X 18 INCH (12/BX)

## (undated) DEVICE — STERI STRIP COMPOUND BENZOIN - TINCTURE 0.6ML WITH APPLICATOR (40EA/BX)

## (undated) DEVICE — GLOVE BIOGEL PI INDICATOR SZ 6.5 SURGICAL PF LF - (50/BX 4BX/CA)

## (undated) DEVICE — NEEDLE NON SAFETY HYPO 22 GA X 1 1/2 IN (100/BX)

## (undated) DEVICE — NEPTUNE 4 PORT MANIFOLD - (20/PK)

## (undated) DEVICE — TRAY CATHETER FOLEY URINE METER W/STATLOCK 350ML (10EA/CA)

## (undated) DEVICE — SODIUM CHL IRRIGATION 0.9% 1000ML (12EA/CA)

## (undated) DEVICE — ELECTRODE 850 FOAM ADHESIVE - HYDROGEL RADIOTRNSPRNT (50/PK)

## (undated) DEVICE — DRAPE IOBAN II INCISE 23X17 - (10EA/BX 4BX/CA)

## (undated) DEVICE — TOOL DISSECT MATCH HEAD

## (undated) DEVICE — PACK BREAST SM OR - (2EA/CA)

## (undated) DEVICE — DRAPE STRLE REG TOWEL 18X24 - (10/BX 4BX/CA)"

## (undated) DEVICE — TUBING C&T SET FLYING LEADS DRAIN TUBING (10EA/BX)

## (undated) DEVICE — SENSOR SPO2 NEO LNCS ADHESIVE (20/BX) SEE USER NOTES

## (undated) DEVICE — GLOVE BIOGEL PI INDICATOR SZ 7.0 SURGICAL PF LF - (50/BX 4BX/CA)

## (undated) DEVICE — BONE MILL BM210

## (undated) DEVICE — SET LEADWIRE 5 LEAD BEDSIDE DISPOSABLE ECG (1SET OF 5/EA)

## (undated) DEVICE — SYRINGE SAFETY 10 ML 18 GA X 1 1/2 BLUNT LL (100/BX 4BX/CA)

## (undated) DEVICE — SUTURE 2-0 ETHILON FS - (36/BX) 18 INCH

## (undated) DEVICE — SPONGE GAUZE STER 4X4 8-PL - (2/PK 50PK/BX 12BX/CS)

## (undated) DEVICE — SHEET TRANSVERSE LAP - (12EA/CA)

## (undated) DEVICE — GOWN WARMING STANDARD FLEX - (30/CA)

## (undated) DEVICE — SET EXTENSION WITH 2 PORTS (48EA/CA) ***PART #2C8610 IS A SUBSTITUTE*****

## (undated) DEVICE — PACK NEURO - (2EA/CA)

## (undated) DEVICE — SUTURE CV

## (undated) DEVICE — BAG SPONGE COUNT 10.25 X 32 - BLUE (250/CA)

## (undated) DEVICE — SUTURE 5-0 PROLENE BV-1 HEMOSEAL (36PK/BX)

## (undated) DEVICE — SLEEVE, VASO, THIGH, MED

## (undated) DEVICE — DRESSING XEROFORM 1X8 - (50/BX 4BX/CA)

## (undated) DEVICE — KIT ROOM DECONTAMINATION

## (undated) DEVICE — ARMREST CRADLE FOAM - (2PR/PK 12PR/CA)

## (undated) DEVICE — CLOSURE WOUND 1/4 X 4 (STERI - STRIP) (50/BX 4BX/CA)

## (undated) DEVICE — COVER LIGHT HANDLE FLEXIBLE - SOFT (2EA/PK 80PK/CA)

## (undated) DEVICE — DRAPE C-ARM LARGE 41IN X 74 IN - (10/BX 2BX/CA)

## (undated) DEVICE — GOWN SURGEONS LARGE - (32/CA)

## (undated) DEVICE — BLADE SURGICAL CLIPPER - (50EA/CA)

## (undated) DEVICE — DRESSING TRANSPARENT FILM TEGADERM 4 X 4.75" (50EA/BX)"

## (undated) DEVICE — SPHERE NAVIGATION STEALTH (5EA/TY 12TY/PK)

## (undated) DEVICE — WATER IRRIGATION STERILE 1000ML (12EA/CA)

## (undated) DEVICE — TUBE E-T HI-LO CUFF 8.0MM (10EA/PK)

## (undated) DEVICE — HEADREST PRONEVIEW LARGE - (10/CA)

## (undated) DEVICE — GLOVE BIOGEL PI ORTHO SZ 7.5 PF LF (40PR/BX)

## (undated) DEVICE — GOWN SURGEONS X-LARGE - DISP. (30/CA)

## (undated) DEVICE — BONE PRESS SPINAL EDITION HENSLER (10EA/CA)